# Patient Record
Sex: MALE | Race: WHITE | Employment: FULL TIME | URBAN - NONMETROPOLITAN AREA
[De-identification: names, ages, dates, MRNs, and addresses within clinical notes are randomized per-mention and may not be internally consistent; named-entity substitution may affect disease eponyms.]

---

## 2018-05-08 ENCOUNTER — OFFICE VISIT (OUTPATIENT)
Dept: CARDIOLOGY CLINIC | Age: 28
End: 2018-05-08
Payer: COMMERCIAL

## 2018-05-08 VITALS
DIASTOLIC BLOOD PRESSURE: 81 MMHG | HEART RATE: 107 BPM | WEIGHT: 248.6 LBS | HEIGHT: 72 IN | BODY MASS INDEX: 33.67 KG/M2 | SYSTOLIC BLOOD PRESSURE: 128 MMHG

## 2018-05-08 DIAGNOSIS — Z01.810 PRE-OPERATIVE CARDIOVASCULAR EXAMINATION: Primary | ICD-10-CM

## 2018-05-08 PROCEDURE — 99203 OFFICE O/P NEW LOW 30 MIN: CPT | Performed by: INTERNAL MEDICINE

## 2019-05-28 ENCOUNTER — INITIAL CONSULT (OUTPATIENT)
Dept: PULMONOLOGY | Age: 29
End: 2019-05-28
Payer: COMMERCIAL

## 2019-05-28 VITALS
OXYGEN SATURATION: 94 % | SYSTOLIC BLOOD PRESSURE: 137 MMHG | HEART RATE: 100 BPM | WEIGHT: 269 LBS | BODY MASS INDEX: 36.44 KG/M2 | DIASTOLIC BLOOD PRESSURE: 88 MMHG | HEIGHT: 72 IN

## 2019-05-28 DIAGNOSIS — R06.89 SLEEP RELATED CHOKING SENSATION: ICD-10-CM

## 2019-05-28 DIAGNOSIS — R06.83 SNORING: ICD-10-CM

## 2019-05-28 DIAGNOSIS — E66.9 OBESITY (BMI 30-39.9): ICD-10-CM

## 2019-05-28 DIAGNOSIS — G47.10 HYPERSOMNIA: Primary | ICD-10-CM

## 2019-05-28 DIAGNOSIS — R06.81 WITNESSED EPISODE OF APNEA: ICD-10-CM

## 2019-05-28 PROCEDURE — 99203 OFFICE O/P NEW LOW 30 MIN: CPT | Performed by: INTERNAL MEDICINE

## 2019-05-28 NOTE — LETTER
Tobacco Use    Smoking status: Never Smoker    Smokeless tobacco: Never Used   Substance Use Topics    Alcohol use: Yes     Comment: occasionally    Drug use: No       No Known Allergies    No current outpatient medications on file. No current facility-administered medications for this visit. Family History   Problem Relation Age of Onset    Heart Surgery Brother 1        has down syndrome/has had several since birth. Any family history of any sleep problems or any one in your family on CPAP? Yes    Caffeine Intake: How much soda (pop), coffee, tea, power drinks do you ingest per day? 4 cups of coffee/4 cans pop a day per day. Employment History:  Where do you work? [employer]  What are your shifts? 6:30am to  4 pm but highly variable    Any recent changes in shifts or hours? No    Physical Exam:    HEIGHTHeight: 6' (182.9 cm) WEIGHTWeight: 269 lb (122 kg)    BMI:  Body mass index is 36.48 kg/m². Neck Size: 19.25 inches  Oxygen Sat: room air    ESS: 13   Vitals: /88 (Site: Right Upper Arm, Position: Sitting, Cuff Size: Large Adult)   Pulse 100   Ht 6' (1.829 m)   Wt 269 lb (122 kg)   SpO2 94% Comment: on RA  BMI 36.48 kg/m²        Mallampati Score: 2    General appearance: alert and oriented to person, place and time and obese  Nose: Nares normal. Septum midline. Mucosa normal. No drainage or sinus tenderness. Oropharynx:  Large tongue, crowded pharynx, mallampati class 4  Lungs: clear to auscultation bilaterally  Heart: regular rate and rhythm, S1, S2 normal, no murmur, click, rub or gallop  Extremities: extremities normal, atraumatic, no cyanosis or edema  Neurologic: Mental status: Alert, oriented, thought content appropriate      Assessment    Diagnosis Orders   1. Hypersomnia  Baseline Diagnostic Sleep Study   2. Obesity (BMI 30-39. 9)  Baseline Diagnostic Sleep Study   3.  Snoring  Baseline Diagnostic Sleep Study 4. Sleep related choking sensation  Baseline Diagnostic Sleep Study   5. Witnessed episode of apnea  Baseline Diagnostic Sleep Study       Plan   Orders Placed This Encounter   Procedures    Baseline Diagnostic Sleep Study     Standing Status:   Future     Standing Expiration Date:   5/28/2020     Order Specific Question:   Adult or Pediatric     Answer:   Adult Study (>7 Years)     Order Specific Question:   Location For Sleep Study     Answer:   [facility]     Order Specific Question:   Select Sleep Lab Location     Answer:   [address]          Mask Desensitization and Pre study teaching? No  Weight Loss Information Given? Yes  Sleep Hygiene Discussed? Yes        Order Specific Question:   Adult or Pediatric     Answer:   Adult Study (>7 Years)     Order Specific Question:   Location For Sleep Study     Answer:   [facility]     Order Specific Question:   Select Sleep Lab Location     Answer:   [address]          Mask Desensitization and Pre study teaching? No  Weight Loss Information Given? Yes  Sleep Hygiene Discussed? Yes      If you have questions, please do not hesitate to call me. I look forward to following Andrez along with you.     Sincerely,        Kristin Borrego MD

## 2019-05-28 NOTE — PROGRESS NOTES
New Sleep Patient H/P    Presentation:  Yesika Acosta is referred by Victorina Art  for ELENA    Symptoms began:  several years ago. Symptoms include: snoring, choking, gasping, periods of not breathing, excessive daytime sleepiness, falling asleep while watching television, when inactive, disrupted sleep, naps  Difficult time waking up, 30 minute sleep inertia  Previous evaluation and treatment? No       Time in Bed:   Bedtime: 11p.m. Awakens  5:45 a.m. Different on weekends? Yes, sleeps til 10 am     Andrez falls asleep in 15  minutes. Any awakenings? Yes 3-4 times a nihjt  Difficulty Falling back to sleep? No    Naps:  Any naps? Yes and are they helpful No    Snoring and Apneas:  Do you snore or been told you a snore? Yes  How long have known about your snoring? years  Any witnessed apneas? Yes  Any awakenings with choking or gasping? Yes    Dreams:  Any recurring dreams? No  Hallucinations? No  Sleep Paralysis? No  Symptoms of Cataplexy? No    Driving History:  Do you have a CDL or drive long distances for work? No  Any driving accidents in the past year? No  Any sleepiness while driving? No    Weight:  Any change in weight over the past year? Yes   How about past 5 years? Yes  How much? 15        Past Medical History:   Diagnosis Date    Asthma        Past Surgical History:   Procedure Laterality Date    LUMBAR DISCECTOMY  04/01/2018       Social History     Tobacco Use    Smoking status: Never Smoker    Smokeless tobacco: Never Used   Substance Use Topics    Alcohol use: Yes     Comment: occasionally    Drug use: No       No Known Allergies    No current outpatient medications on file. No current facility-administered medications for this visit. Family History   Problem Relation Age of Onset    Heart Surgery Brother 1        has down syndrome/has had several since birth. Any family history of any sleep problems or any one in your family on CPAP? Yes    Caffeine Intake:   How much soda (pop), coffee, tea, power drinks do you ingest per day? 4 cups of coffee/4 cans pop a day per day. Employment History:  Where do you work? [employer]  What are your shifts? 6:30am to  4 pm but highly variable    Any recent changes in shifts or hours? No    Physical Exam:    HEIGHTHeight: 6' (182.9 cm) WEIGHTWeight: 269 lb (122 kg)    BMI:  Body mass index is 36.48 kg/m². Neck Size: 19.25 inches  Oxygen Sat: room air    ESS: 13   Vitals: /88 (Site: Right Upper Arm, Position: Sitting, Cuff Size: Large Adult)   Pulse 100   Ht 6' (1.829 m)   Wt 269 lb (122 kg)   SpO2 94% Comment: on RA  BMI 36.48 kg/m²       Mallampati Score: 2    General appearance: alert and oriented to person, place and time and obese  Nose: Nares normal. Septum midline. Mucosa normal. No drainage or sinus tenderness. Oropharynx:  Large tongue, crowded pharynx, mallampati class 4  Lungs: clear to auscultation bilaterally  Heart: regular rate and rhythm, S1, S2 normal, no murmur, click, rub or gallop  Extremities: extremities normal, atraumatic, no cyanosis or edema  Neurologic: Mental status: Alert, oriented, thought content appropriate      Assessment    Diagnosis Orders   1. Hypersomnia  Baseline Diagnostic Sleep Study   2. Obesity (BMI 30-39. 9)  Baseline Diagnostic Sleep Study   3. Snoring  Baseline Diagnostic Sleep Study   4. Sleep related choking sensation  Baseline Diagnostic Sleep Study   5. Witnessed episode of apnea  Baseline Diagnostic Sleep Study       Plan   Orders Placed This Encounter   Procedures    Baseline Diagnostic Sleep Study     Standing Status:   Future     Standing Expiration Date:   5/28/2020     Order Specific Question:   Adult or Pediatric     Answer:   Adult Study (>7 Years)     Order Specific Question:   Location For Sleep Study     Answer:   [id number]     Order Specific Question:   Select Sleep Lab Location     Answer:   [address]          Mask Desensitization and Pre study teaching? No  Weight Loss Information Given? Yes  Sleep Hygiene Discussed?  Yes

## 2019-05-28 NOTE — COMMUNICATION BODY
New Sleep Patient H/P    Presentation:  Leopold Gouty is referred by Frank Finn  for ELENA    Symptoms began:  several years ago. Symptoms include: snoring, choking, gasping, periods of not breathing, excessive daytime sleepiness, falling asleep while watching television, when inactive, disrupted sleep, naps  Difficult time waking up, 30 minute sleep inertia  Previous evaluation and treatment? No       Time in Bed:   Bedtime: 11p.m. Awakens  5:45 a.m. Different on weekends? Yes, sleeps til 10 am     Andrez falls asleep in 15  minutes. Any awakenings? Yes 3-4 times a nihjt  Difficulty Falling back to sleep? No    Naps:  Any naps? Yes and are they helpful No    Snoring and Apneas:  Do you snore or been told you a snore? Yes  How long have known about your snoring? years  Any witnessed apneas? Yes  Any awakenings with choking or gasping? Yes    Dreams:  Any recurring dreams? No  Hallucinations? No  Sleep Paralysis? No  Symptoms of Cataplexy? No    Driving History:  Do you have a CDL or drive long distances for work? No  Any driving accidents in the past year? No  Any sleepiness while driving? No    Weight:  Any change in weight over the past year? Yes   How about past 5 years? Yes  How much? 15        Past Medical History:   Diagnosis Date    Asthma        Past Surgical History:   Procedure Laterality Date    LUMBAR DISCECTOMY  04/01/2018       Social History     Tobacco Use    Smoking status: Never Smoker    Smokeless tobacco: Never Used   Substance Use Topics    Alcohol use: Yes     Comment: occasionally    Drug use: No       No Known Allergies    No current outpatient medications on file. No current facility-administered medications for this visit. Family History   Problem Relation Age of Onset    Heart Surgery Brother 1        has down syndrome/has had several since birth. Any family history of any sleep problems or any one in your family on CPAP? Yes    Caffeine Intake:   How much soda (pop), coffee, tea, power drinks do you ingest per day? 4 cups of coffee/4 cans pop a day per day. Employment History:  Where do you work? [employer]  What are your shifts? 6:30am to  4 pm but highly variable    Any recent changes in shifts or hours? No    Physical Exam:    HEIGHTHeight: 6' (182.9 cm) WEIGHTWeight: 269 lb (122 kg)    BMI:  Body mass index is 36.48 kg/m². Neck Size: 19.25 inches  Oxygen Sat: room air    ESS: 13   Vitals: /88 (Site: Right Upper Arm, Position: Sitting, Cuff Size: Large Adult)   Pulse 100   Ht 6' (1.829 m)   Wt 269 lb (122 kg)   SpO2 94% Comment: on RA  BMI 36.48 kg/m²        Mallampati Score: 2    General appearance: alert and oriented to person, place and time and obese  Nose: Nares normal. Septum midline. Mucosa normal. No drainage or sinus tenderness. Oropharynx:  Large tongue, crowded pharynx, mallampati class 4  Lungs: clear to auscultation bilaterally  Heart: regular rate and rhythm, S1, S2 normal, no murmur, click, rub or gallop  Extremities: extremities normal, atraumatic, no cyanosis or edema  Neurologic: Mental status: Alert, oriented, thought content appropriate      Assessment    Diagnosis Orders   1. Hypersomnia  Baseline Diagnostic Sleep Study   2. Obesity (BMI 30-39. 9)  Baseline Diagnostic Sleep Study   3. Snoring  Baseline Diagnostic Sleep Study   4. Sleep related choking sensation  Baseline Diagnostic Sleep Study   5. Witnessed episode of apnea  Baseline Diagnostic Sleep Study       Plan   Orders Placed This Encounter   Procedures    Baseline Diagnostic Sleep Study     Standing Status:   Future     Standing Expiration Date:   5/28/2020     Order Specific Question:   Adult or Pediatric     Answer:   Adult Study (>7 Years)     Order Specific Question:   Location For Sleep Study     Answer:   [facility]     Order Specific Question:   Select Sleep Lab Location     Answer:   [address]          Mask Desensitization and Pre study teaching? No  Weight Loss Information Given? Yes  Sleep Hygiene Discussed? Yes          Order Specific Question:   Adult or Pediatric     Answer:   Adult Study (>7 Years)     Order Specific Question:   Location For Sleep Study     Answer:   [facility]     Order Specific Question:   Select Sleep Lab Location     Answer:   [address]          Mask Desensitization and Pre study teaching? No  Weight Loss Information Given? Yes  Sleep Hygiene Discussed?  Yes

## 2019-06-23 DIAGNOSIS — G47.33 OSA (OBSTRUCTIVE SLEEP APNEA): Primary | ICD-10-CM

## 2019-07-08 ENCOUNTER — OFFICE VISIT (OUTPATIENT)
Dept: PULMONOLOGY | Age: 29
End: 2019-07-08
Payer: COMMERCIAL

## 2019-07-08 VITALS
WEIGHT: 272.2 LBS | DIASTOLIC BLOOD PRESSURE: 87 MMHG | HEART RATE: 75 BPM | SYSTOLIC BLOOD PRESSURE: 138 MMHG | OXYGEN SATURATION: 98 % | BODY MASS INDEX: 36.87 KG/M2 | HEIGHT: 72 IN

## 2019-07-08 DIAGNOSIS — G47.33 OSA (OBSTRUCTIVE SLEEP APNEA): Primary | ICD-10-CM

## 2019-07-08 PROCEDURE — 99214 OFFICE O/P EST MOD 30 MIN: CPT | Performed by: NURSE PRACTITIONER

## 2019-07-08 NOTE — PROGRESS NOTES
itching. Exam  Vitals -  /87 (Site: Left Upper Arm, Position: Sitting, Cuff Size: Large Adult)   Pulse 75   Ht 6' (1.829 m)   Wt 272 lb 3.2 oz (123.5 kg)   SpO2 98% Comment: on RA  BMI 36.92 kg/m²    Body mass index is 36.92 kg/m². Oxygen level - Room Air  Physical Exam     General Appearance Moderately built, moderately nourished, in no acute distress. HEENT - Head is normocephalic, atraumatic. PERRL. Oral mucosa pink and moist, no oral thrush. Mallampati Score - II (soft palate, uvula, fauces visible). Neck - Supple, symmetrical, trachea midline and soft. Lungs - Chest symmetric with normal A/P diameter, normal respiratory rate and rhythm, lungs clear to auscultation, no wheezes, rales or rhonchi, aeration good. Cardiovascular - Heart sounds are normal. Regular rhythm normal rate without murmur, gallop or rub. Abdomen - Soft, nontender, non-distended. Neurologic - Alert and oriented x 3. Skin - No bruising or bleeding. Extremities - No cyanosis, clubbing or edema. Assessment   Diagnosis Orders   1. ELENA (obstructive sleep apnea)  Sleep Study with PAP Titration      Recommendations  I reviewed the sleep study results in detail with Marco A Antunez and his wife. We discussed treatment options of positional therapy, OAT and PAP therapy along with the benefits and limitations of each. Based on the severity of his apnea, PAP therapy is recommended. He agrees to proceed with CPAP titration with mask fit. Follow up 6-8 weeks after PAP set up.     Electronically signed by ZIGGY Cunha CNP on 7/8/2019 at 9:25 AM

## 2019-08-15 ENCOUNTER — HOSPITAL ENCOUNTER (OUTPATIENT)
Dept: SLEEP CENTER | Age: 29
Discharge: HOME | End: 2019-08-17
Payer: COMMERCIAL

## 2019-08-15 DIAGNOSIS — G47.33 OSA (OBSTRUCTIVE SLEEP APNEA): ICD-10-CM

## 2019-08-15 PROCEDURE — 95811 POLYSOM 6/>YRS CPAP 4/> PARM: CPT

## 2019-08-20 LAB — STATUS: NORMAL

## 2019-08-25 DIAGNOSIS — G47.33 OSA (OBSTRUCTIVE SLEEP APNEA): Primary | ICD-10-CM

## 2019-08-25 NOTE — PROGRESS NOTES
[address] [place] [address]                               SLEEP STUDY REPORT    PATIENT NAME: Rogelio Meza                    :        1990  MED REC NO:   [id number]                           ROOM:  ACCOUNT NO:   [de-identified]                           ADMIT DATE: 08/15/2019  PROVIDER:     ZEYAD Anderson STUDY:  08/15/2019    CPAP TITRATION POLYSOMNOGRAM    REFERRING PROVIDER:  Emiliano Brito DO    HISTORY OF PRESENT ILLNESS:  The patient is a 25-year-old gentleman  recently diagnosed with severe sleep apnea via diagnostic polysomnogram.  The AHI was 60.2. Weight 272 pounds, height 72 inches, BMI 36.9. METHODS:  The patient underwent digital polysomnography in compliance  with the standards and specifications from the AASM Manual including the  simultaneous recording of 3 EEG channels (F4-M1, C4-M1, and O2-M1 with  back up electrodes F3-M2, C3-M2, and O1-M2), 2 EOG channels (E1-M2, and  E2-M1,), EMG (chin, left & right leg), EKG, Nonin pulse oximetry with   less than 2 second averaging time, body position, airflow recorded by  oral-nasal thermal sensor and nasal air pressure transducer, plus  respiratory effort recorded by calibrated respiratory inductance  plethysmography (RIP), flow volume loop, sound and video. Sleep staging  and scoring followed the standard put forth by the American Academy of  Sleep Medicine and utilized the 4A obstructive hypopnea event  desaturation of 4 percent or greater. DETAILS OF THE STUDY:  Lights out 09:21 p.m., lights on 05:25 a.m. Total recording time 483 minutes, sleep period time 481 minutes, total  sleep time 443 minutes, sleep efficiency 91.6%, latency to sleep 1.9  minutes, wake after sleep onset 38 minutes, latency to REM 96 minutes.     SLEEP STAGING:  The patient slept 18.5 minutes or 4.4% of total sleep  time in N1 sleep, 315 minutes or 71% in N2 sleep, 31 minutes

## 2019-08-26 ENCOUNTER — TELEPHONE (OUTPATIENT)
Dept: SLEEP CENTER | Age: 29
End: 2019-08-26

## 2019-11-25 ENCOUNTER — OFFICE VISIT (OUTPATIENT)
Dept: PULMONOLOGY | Age: 29
End: 2019-11-25
Payer: COMMERCIAL

## 2019-11-25 VITALS
HEIGHT: 72 IN | DIASTOLIC BLOOD PRESSURE: 82 MMHG | OXYGEN SATURATION: 96 % | WEIGHT: 280.2 LBS | HEART RATE: 84 BPM | SYSTOLIC BLOOD PRESSURE: 132 MMHG | BODY MASS INDEX: 37.95 KG/M2

## 2019-11-25 DIAGNOSIS — G47.26 SHIFTING SLEEP-WORK SCHEDULE: ICD-10-CM

## 2019-11-25 DIAGNOSIS — G47.33 OSA TREATED WITH BIPAP: Primary | ICD-10-CM

## 2019-11-25 PROCEDURE — 99213 OFFICE O/P EST LOW 20 MIN: CPT | Performed by: NURSE PRACTITIONER

## 2020-02-26 NOTE — PROGRESS NOTES
bleeding. Extremities - No cyanosis, clubbing or edema. ASSESSMENT/DIAGNOSIS     Diagnosis Orders   1. ELENA treated with BiPAP     2. Shifting sleep-work schedule              Plan   Do you need any equipment today? No.    -Discussed need for increased compliance for insurance coverage and health benefit.  -Wear when napping as well. -Will decrease pressure to 15/11, to call if no improvement. Repeat download in 2-4 weeks. - He was advised to continue current positive airway pressure therapy with above described pressure. - He was advised to keep good compliance with current recommended pressure to get optimal results and clinical improvement.  - Recommend 7-9 hours of sleep with PAP treatment. - He was advised to call [employer] regarding supplies if needed.   -He is to call my office for earlier appointment if needed for worsening of sleep symptoms.   - He was instructed on weight loss. - Riya Penn was educated about my impression and plan and verbalizes understanding. We will see Stanton Toth back in: 6 months with download.     ZIGGY Davis - CNP  2/27/2020 1:04 PM

## 2020-02-27 ENCOUNTER — OFFICE VISIT (OUTPATIENT)
Dept: PULMONOLOGY | Age: 30
End: 2020-02-27
Payer: COMMERCIAL

## 2020-02-27 VITALS
HEART RATE: 79 BPM | WEIGHT: 282.4 LBS | OXYGEN SATURATION: 98 % | BODY MASS INDEX: 38.25 KG/M2 | SYSTOLIC BLOOD PRESSURE: 134 MMHG | DIASTOLIC BLOOD PRESSURE: 78 MMHG | HEIGHT: 72 IN

## 2020-02-27 PROCEDURE — 99213 OFFICE O/P EST LOW 20 MIN: CPT | Performed by: NURSE PRACTITIONER

## 2020-03-12 ENCOUNTER — CLINICAL DOCUMENTATION (OUTPATIENT)
Dept: PULMONOLOGY | Age: 30
End: 2020-03-12

## 2020-03-12 NOTE — PROGRESS NOTES
Download reviewed with decrease in pressure to 15/11 and controlled AHI. No calls from patient, continue current settings.     Electronically signed by ZIGGY Paula CNP on 3/12/2020 at 4:29 PM

## 2020-08-27 ENCOUNTER — OFFICE VISIT (OUTPATIENT)
Dept: PULMONOLOGY | Age: 30
End: 2020-08-27
Payer: COMMERCIAL

## 2020-08-27 VITALS
HEART RATE: 89 BPM | TEMPERATURE: 98.9 F | DIASTOLIC BLOOD PRESSURE: 84 MMHG | SYSTOLIC BLOOD PRESSURE: 136 MMHG | HEIGHT: 73 IN | BODY MASS INDEX: 37.91 KG/M2 | OXYGEN SATURATION: 97 % | WEIGHT: 286 LBS

## 2020-08-27 PROCEDURE — 99213 OFFICE O/P EST LOW 20 MIN: CPT | Performed by: NURSE PRACTITIONER

## 2020-08-27 NOTE — PROGRESS NOTES
[facility] and [address] [place] [address] Navneet         [phone]  8/27/2020   Chief Complaint   Patient presents with    Follow-up     ELENA follow u pwith download     Other     Last office visit was 2/27/20        Pt of Dr. Rufus Mora     PAP Download:   Selene Velez or initial AHI: 60.2   Date of initial study: 6/18/19  Weight of initial study: 269 lbs  [x] Compliant  80%   [] Noncompliant 20%     PAP Type  cpap Level  15/11   Avg Hrs/Day 5 hour 21 min   AHI: 1.1    Recorded compliance dates,  7/27/20  to 8/25/20    Machine/Mfg:  Plumbr  Interface: FFM     Provider:  [x]-SHEILA  []Maira [][facility]  [][facility]         [][facility] []Other:     Neck Size: 19.25  Mallampati 2  ESS: 8  Here is a scan of the most recent download:              Presentation:   Medhat Hoover presents for sleep medicine follow up for obstructive sleep apnea. Since the last visit, Medhat Hoover is doing better with treatment. Pressure was decreased from 16/11 to 15/11 last visit with resolved GI symptoms. Weight is up 17 lbs since study. Equipment issues: The pressure is acceptable, the mask is acceptable and he is using the humidity. Sleep issues:  Do you feel better? Yes  More rested? Yes   Better concentration? yes    Progress History:   Since last visit any new medical issues? No  New ER or hospitlal visits? No  Any new or changes in medicines? No  Any new sleep medicines?  No      Past Medical History:   Diagnosis Date    Asthma     ELENA treated with BiPAP        Past Surgical History:   Procedure Laterality Date    LUMBAR DISCECTOMY  04/01/2018       Social History     Tobacco Use    Smoking status: Never Smoker    Smokeless tobacco: Never Used   Substance Use Topics    Alcohol use: Yes     Comment: occasionally    Drug use: No       No Known Allergies    Current Outpatient Medications   Medication Sig Dispense Refill    CPAP Machine MISC by Does not apply route Please change BiPAP pressure to IPAP 15, keep EPAP at 11  cm H20. Repeat download in 2-4 weeks. 1 each 0     No current facility-administered medications for this visit. Family History   Problem Relation Age of Onset    Heart Surgery Brother 1        has down syndrome/has had several since birth.  Sleep Apnea Mother           Review of Systems   General/Constitutional: No recent loss of weight or appetite changes. No fever or chills. HENT: Negative. Eyes: Negative. Upper respiratory tract: No nasal stuffiness or post nasal drip. Lower respiratory tract/ lungs: No cough or sputum production. No hemoptysis. Cardiovascular: No palpitations or chest pain. Gastrointestinal: No nausea or vomiting. Neurological: No focal neurologiacal weakness. Extremities: No edema. Musculoskeletal: No complaints. Genitourinary: No complaints. Hematological: Negative. Psychiatric/Behavioral: Negative. Skin: No itching. Physical Exam:    BMI: Body mass index is 37.73 kg/m². Wt Readings from Last 3 Encounters:   08/27/20 286 lb (129.7 kg)   02/27/20 282 lb 6.4 oz (128.1 kg)   11/25/19 280 lb 3.2 oz (127.1 kg)     Weight gained 17 lbs since stust  Vitals: /84   Pulse 89   Temp 98.9 °F (37.2 °C) (Tympanic)   Ht 6' 1\" (1.854 m)   Wt 286 lb (129.7 kg)   SpO2 97% Comment: room air  BMI 37.73 kg/m²         General Appearance - Moderately built, moderately nourished, in no acute distress. HEENT - Head is normocephalic, atraumatic. PERRL. Oral mucosa pink and moist, no oral thrush. Mallampati Score - II (soft palate, uvula, fauces visible). Neck - Supple, symmetrical, trachea midline and soft. Lungs - Clear to auscultation, no wheezes, rales or rhonchi, aeration good. Cardiovascular - Heart sounds are normal. Regular rhythm normal rate without murmur, gallop or rub. Abdomen - Soft, nontender, non-distended. Neurologic - Alert and oriented x 3. Skin - No bruising or bleeding. Extremities - No cyanosis, clubbing or edema.       ASSESSMENT/DIAGNOSIS Diagnosis Orders   1. ELENA treated with BiPAP              Plan   Do you need any equipment today? No.  -Monitor weight.    - He was advised to continue current positive airway pressure therapy with above described pressure. - He was advised to keep good compliance with current recommended pressure to get optimal results and clinical improvement.  - Recommend 7-9 hours of sleep with PAP treatment. - He was advised to call [employer] regarding supplies if needed.   -He is to call my office for earlier appointment if needed for worsening of sleep symptoms.   - He was instructed on weight loss. - Aaliyah Pina was educated about my impression and plan and verbalizes understanding. We will see [place] Dylan Toth back in: 6 months with download.     Laura Cui   8/27/2020 11:05 AM

## 2021-02-26 NOTE — PROGRESS NOTES
change BiPAP pressure to IPAP 15, keep EPAP at 11  cm H20. Repeat download in 2-4 weeks. 1 each 0     No current facility-administered medications for this visit. Review of Systems   General/Constitutional: No recent loss of weight or appetite changes. No fever or chills. HENT: Negative. Eyes: Negative. Upper respiratory tract: No nasal stuffiness or post nasal drip. Lower respiratory tract/ lungs: No cough or sputum production. No hemoptysis. Cardiovascular: No palpitations or chest pain. Gastrointestinal: No nausea or vomiting. Neurological: No focal neurologiacal weakness. Extremities: No increased edema. Musculoskeletal: No new complaints. Genitourinary: No complaints. Hematological: Negative. Psychiatric/Behavioral: Negative. Skin: No itching. Physical Exam:    BMI: Body mass index is 38.52 kg/m². Wt Readings from Last 3 Encounters:   03/01/21 292 lb (132.5 kg)   08/27/20 286 lb (129.7 kg)   02/27/20 282 lb 6.4 oz (128.1 kg)     Weight: stable / unchanged since last seen (up 20 lbs since study)  Vitals: /78 (Site: Right Upper Arm, Position: Sitting, Cuff Size: Medium Adult)   Pulse 84   Temp 98.1 °F (36.7 °C)   Ht 6' 1\" (1.854 m)   Wt 292 lb (132.5 kg)   SpO2 97% Comment: room air at rest  BMI 38.52 kg/m²         General Appearance - Moderately built, moderately nourished, in no acute distress. HEENT - Head is normocephalic, atraumatic. PERRL. Oral mucosa pink and moist, no oral thrush. Mallampati Score - II (soft palate, uvula, fauces visible). Neck - Supple, symmetrical, trachea midline and soft. Lungs - Clear to auscultation, no wheezes, rales or rhonchi, aeration good. Cardiovascular - Heart sounds are normal. Regular rhythm normal rate without murmur, gallop or rub. Abdomen - Soft, nontender, non-distended. Neurologic - Alert and oriented x 3. Skin - No bruising or bleeding. Extremities - No cyanosis, clubbing or edema.       ASSESSMENT/DIAGNOSIS Diagnosis Orders   1. ELENA on CPAP  DME Order for CPAP as OP            Plan     Do you need any equipment today? Yes Rx for new supplies.   -Download reviewed, doing well. -Monitor weight.    - He was advised to continue current positive airway pressure therapy with above described pressure. - He was advised to keep good compliance with current recommended pressure to get optimal results and clinical improvement.  - Educated on the health risks with untreated sleep apnea. - Recommend 7-9 hours of sleep with PAP treatment. - Re-educated on proper sleep hygiene. - He was advised to call DME company regarding supplies if needed. - He is to call my office for earlier appointment if needed for worsening of sleep symptoms.   - He was instructed on weight loss. - Jennifer Spencer was educated about my impression and plan and verbalizes understanding. We will see Jessica Toth back in: 1 year with download.       Electronically signed by ZIGGY Kearney CNP on 3/1/2021 at 11:10 AM

## 2021-03-01 ENCOUNTER — OFFICE VISIT (OUTPATIENT)
Dept: PULMONOLOGY | Age: 31
End: 2021-03-01
Payer: COMMERCIAL

## 2021-03-01 VITALS
TEMPERATURE: 98.1 F | HEART RATE: 84 BPM | WEIGHT: 292 LBS | DIASTOLIC BLOOD PRESSURE: 78 MMHG | OXYGEN SATURATION: 97 % | HEIGHT: 73 IN | SYSTOLIC BLOOD PRESSURE: 118 MMHG | BODY MASS INDEX: 38.7 KG/M2

## 2021-03-01 DIAGNOSIS — Z99.89 OSA ON CPAP: Primary | ICD-10-CM

## 2021-03-01 DIAGNOSIS — G47.33 OSA ON CPAP: Primary | ICD-10-CM

## 2021-03-01 PROCEDURE — 99212 OFFICE O/P EST SF 10 MIN: CPT | Performed by: NURSE PRACTITIONER

## 2022-03-10 ENCOUNTER — OFFICE VISIT (OUTPATIENT)
Dept: PULMONOLOGY | Age: 32
End: 2022-03-10
Payer: COMMERCIAL

## 2022-03-10 VITALS
DIASTOLIC BLOOD PRESSURE: 78 MMHG | HEIGHT: 73 IN | HEART RATE: 94 BPM | OXYGEN SATURATION: 96 % | WEIGHT: 288 LBS | TEMPERATURE: 98 F | BODY MASS INDEX: 38.17 KG/M2 | SYSTOLIC BLOOD PRESSURE: 122 MMHG

## 2022-03-10 DIAGNOSIS — G47.33 OSA TREATED WITH BIPAP: Primary | ICD-10-CM

## 2022-03-10 DIAGNOSIS — E66.9 OBESITY (BMI 30-39.9): ICD-10-CM

## 2022-03-10 PROCEDURE — 99213 OFFICE O/P EST LOW 20 MIN: CPT | Performed by: NURSE PRACTITIONER

## 2022-03-10 ASSESSMENT — ENCOUNTER SYMPTOMS
VOMITING: 0
WHEEZING: 0
DIARRHEA: 0
COUGH: 0
EYES NEGATIVE: 1
SHORTNESS OF BREATH: 0
ABDOMINAL PAIN: 0
NAUSEA: 0

## 2022-03-10 NOTE — PROGRESS NOTES
Negative. Psychiatric/Behavioral: Negative. Negative for sleep disturbance. Physical Exam:    BMI:  Body mass index is 38 kg/m². Wt Readings from Last 3 Encounters:   03/10/22 288 lb (130.6 kg)   03/01/21 292 lb (132.5 kg)   08/27/20 286 lb (129.7 kg)     Weight lost 5 lbs over 1year   Vitals: /78 (Site: Left Upper Arm, Position: Sitting)   Pulse 94   Temp 98 °F (36.7 °C)   Ht 6' 1\" (1.854 m)   Wt 288 lb (130.6 kg)   SpO2 96% Comment: on RA  BMI 38.00 kg/m²       Physical Exam  Vitals and nursing note reviewed. Constitutional:       Appearance: Normal appearance. He is overweight. HENT:      Head: Normocephalic and atraumatic. Mouth/Throat:      Pharynx: Oropharynx is clear. Eyes:      Conjunctiva/sclera: Conjunctivae normal.   Pulmonary:      Effort: Pulmonary effort is normal. No tachypnea, bradypnea or respiratory distress. Skin:     Findings: No erythema or rash. Neurological:      Mental Status: He is alert and oriented to person, place, and time. Psychiatric:         Attention and Perception: Attention normal.         Mood and Affect: Mood normal.         Speech: Speech normal.         Behavior: Behavior normal.         Thought Content: Thought content normal.         Cognition and Memory: Cognition normal.         Judgment: Judgment normal.           ASSESSMENT/DIAGNOSIS     Diagnosis Orders   1. ELENA treated with BiPAP  DME Order for CPAP as OP   2. Obesity (BMI 30-39. 9)          Plan   Do you need any equipment today? Yes -printed rx for supplies, printed and given to patient's with names of other local DMEs to use      - Download reviewed and discussed with patient  - He  was advised to continue current positive airway pressure therapy with above described pressure.    - He  advised to keep good compliance with current recommended pressure to get optimal results and clinical improvement  - Recommend 7-9 hours of sleep with PAP  - He was advised to call DME company regarding supplies if needed.   -He call my office for earlier appointment if needed for worsening of sleep symptoms.   - He was instructed on weight loss  - Madhav Foy was educated about my impression and plan. Patient verbalizesunderstanding.     We will see Stuart Marshall Risser back in: 1 year with download    Information added by my medical assistant/LPN was reviewed today    billing based on medical decision making     Asher Sacks, APRN-CNP   3/10/2022

## 2022-03-10 NOTE — PATIENT INSTRUCTIONS
Other DME companies :     Katy Weaver     [facility] in [place] - in [address] [place] [address]

## 2022-10-11 ENCOUNTER — TELEPHONE (OUTPATIENT)
Dept: PULMONOLOGY | Age: 32
End: 2022-10-11

## 2022-10-11 NOTE — TELEPHONE ENCOUNTER
Patient was last seen 3/10/2022 and his next appt is 3/9/2023. Due to insurance he has to change [facility] [facility] his cpap and supplies. He needs a copy of his sleep study, last office visit note, order/prescription for supplies and a copy of his [employer] insurance card to be faxed to [address]. Please send.

## 2023-03-09 ENCOUNTER — OFFICE VISIT (OUTPATIENT)
Dept: PULMONOLOGY | Age: 33
End: 2023-03-09
Payer: COMMERCIAL

## 2023-03-09 VITALS
HEIGHT: 73 IN | WEIGHT: 298.4 LBS | DIASTOLIC BLOOD PRESSURE: 78 MMHG | HEART RATE: 89 BPM | SYSTOLIC BLOOD PRESSURE: 120 MMHG | OXYGEN SATURATION: 96 % | BODY MASS INDEX: 39.55 KG/M2 | TEMPERATURE: 98.8 F

## 2023-03-09 DIAGNOSIS — E66.9 OBESITY (BMI 30-39.9): ICD-10-CM

## 2023-03-09 DIAGNOSIS — G47.33 OSA TREATED WITH BIPAP: Primary | ICD-10-CM

## 2023-03-09 PROCEDURE — 99213 OFFICE O/P EST LOW 20 MIN: CPT | Performed by: NURSE PRACTITIONER

## 2023-03-09 ASSESSMENT — ENCOUNTER SYMPTOMS
ABDOMINAL PAIN: 0
NAUSEA: 0
VOMITING: 0
WHEEZING: 0
DIARRHEA: 0
EYES NEGATIVE: 1
SHORTNESS OF BREATH: 0
COUGH: 0

## 2023-03-09 NOTE — PROGRESS NOTES
[facility]      Merle          [id number]  3/9/2023   Chief Complaint   Patient presents with    Follow-up     ELENA 1 year f/u with download. Pt of Dr. Madhavi Polanco    PAP Download:   Original or initial AHI: 60.2     Date of initial study: 6/18/19      Compliant  90%     Noncompliant 10 %     PAP Type Spont Level  11-15 cmH2O   Avg Hrs/Day 5hrs 49mins  AHI: 1.8   Recorded compliance dates , 2/6/23  to 3/7/23   Machine/Mfg:   [x] [employer]Med    [] Respironics/Dreamstation   Interface:   [] Nasal    [] Nasal pillows   [x] FFM      Provider:      [x] [facility]     [facility]     [] [employer]    [] [address]    [] [facility]               [] [facility]      [] [employer]    [] [address]      [] Other    Neck Size: 19.25  Mallampati 2  ESS:  6  SAQLI: 83    Here is a scan of the most recent download:                Presentation:   Jeffery Rolon presents for sleep medicine follow up for obstructive sleep apnea  Since the last visit, Jeffery Rolon doing well on BiPAP, no complaints . Weight stable / unchanged    Equipment issues: The pressure is  acceptable, the mask is acceptable     Sleep issues:  Do you feel better? Yes  More rested? Yes   Better concentration? yes  Difficulty falling sleep? No  Difficulty staying asleep? Yes and No  Snoring? No    Progress History:   Since last visit any new medical issues? No  New ER or hospital visits? No  Any new or changes in medicines? No  Any new sleep medicines? No    Review of Systems -   Review of Systems   Constitutional:  Negative for activity change, appetite change, chills, fatigue, fever and unexpected weight change. HENT: Negative. Eyes: Negative. Respiratory:  Negative for cough, shortness of breath and wheezing. Cardiovascular:  Negative for chest pain, palpitations and leg swelling. Gastrointestinal:  Negative for abdominal pain, diarrhea, nausea and vomiting. Genitourinary: Negative. Musculoskeletal: Negative. Skin: Negative. Neurological: Negative. Hematological: Negative. Psychiatric/Behavioral: Negative. Negative for sleep disturbance. Physical Exam:    BMI:  Body mass index is 39.37 kg/m². Wt Readings from Last 3 Encounters:   03/09/23 298 lb 6.4 oz (135.4 kg)   03/10/22 288 lb (130.6 kg)   03/01/21 292 lb (132.5 kg)     Vitals: /78   Pulse 89   Temp 98.8 °F (37.1 °C)   Ht 6' 1\" (1.854 m)   Wt 298 lb 6.4 oz (135.4 kg)   SpO2 96%   BMI 39.37 kg/m²       Physical Exam  Vitals and nursing note reviewed. Constitutional:       Appearance: Normal appearance. He is overweight. HENT:      Head: Normocephalic and atraumatic. Mouth/Throat:      Pharynx: Oropharynx is clear. Eyes:      Conjunctiva/sclera: Conjunctivae normal.   Pulmonary:      Effort: Pulmonary effort is normal. No tachypnea, bradypnea or respiratory distress. Skin:     Findings: No erythema or rash. Neurological:      Mental Status: He is alert and oriented to person, place, and time. Psychiatric:         Attention and Perception: Attention normal.         Mood and Affect: Mood normal.         Speech: Speech normal.         Behavior: Behavior normal.         Thought Content: Thought content normal.         Cognition and Memory: Cognition normal.         Judgment: Judgment normal.         ASSESSMENT/DIAGNOSIS     Diagnosis Orders   1. ELENA treated with BiPAP  DME Order for CPAP as OP      2. Obesity (BMI 30-39. 9)  DME Order for CPAP as OP               Plan   Do you need any equipment today? Yes rx for supplies   -Patient's symptoms and AHI are well controlled with current settings      -Advised to continue current positive airway pressure therapy with above described pressure.    -Advised to keep good compliance with current recommended pressure to get optimal results and clinical improvement  -Recommend 7-9 hours of sleep with PAP  -Instructed to call DME company regarding supplies if needed.   -Patient to call office for earlier appointment if needed for worsening of sleep symptoms. -Advised patient not to drive if feeling sleepy  -Discussed weight loss  -Educated about my impression and plan. Patient verbalizes understanding. Will see Tj Toth back in: 1 year with download. Information added by my medical assistant/LPN was reviewed today.     Electronically signed by ZIGGY Hernandez CNP on 3/9/2023 at 1:07 PM Fibrosarcoma Histology Text: There is a tumor consisting of uniform spindle cells organized in fascicles arranged in acute angles to each other (\"herring bone\" pattern).  Occasional cells are more round.  Mitosis are commonly seen.

## 2023-05-01 ENCOUNTER — TELEPHONE (OUTPATIENT)
Dept: PULMONOLOGY | Age: 33
End: 2023-05-01

## 2023-05-01 NOTE — TELEPHONE ENCOUNTER
Patient was last seen 3/9/2023 and his next appt is 3/7/2024. He is switching his DME company to [facility] and he needs to have his cpap order, chart notes and copy of sleep study faxed to [facility] at [phone]. Please advise.

## 2023-10-05 ENCOUNTER — OFFICE VISIT (OUTPATIENT)
Dept: FAMILY MEDICINE CLINIC | Age: 33
End: 2023-10-05
Payer: COMMERCIAL

## 2023-10-05 VITALS
SYSTOLIC BLOOD PRESSURE: 136 MMHG | RESPIRATION RATE: 16 BRPM | OXYGEN SATURATION: 96 % | DIASTOLIC BLOOD PRESSURE: 86 MMHG | BODY MASS INDEX: 39.16 KG/M2 | HEART RATE: 84 BPM | TEMPERATURE: 97.2 F | WEIGHT: 296.8 LBS

## 2023-10-05 DIAGNOSIS — Z11.59 ENCOUNTER FOR HCV SCREENING TEST FOR LOW RISK PATIENT: ICD-10-CM

## 2023-10-05 DIAGNOSIS — Z13.31 DEPRESSION SCREEN: ICD-10-CM

## 2023-10-05 DIAGNOSIS — Z00.00 WELL ADULT EXAM: Primary | ICD-10-CM

## 2023-10-05 DIAGNOSIS — E66.09 CLASS 2 OBESITY DUE TO EXCESS CALORIES WITHOUT SERIOUS COMORBIDITY WITH BODY MASS INDEX (BMI) OF 39.0 TO 39.9 IN ADULT: ICD-10-CM

## 2023-10-05 DIAGNOSIS — Z11.4 SCREENING FOR HUMAN IMMUNODEFICIENCY VIRUS: ICD-10-CM

## 2023-10-05 DIAGNOSIS — G47.33 OSA ON CPAP: ICD-10-CM

## 2023-10-05 PROBLEM — E66.812 CLASS 2 OBESITY DUE TO EXCESS CALORIES WITHOUT SERIOUS COMORBIDITY WITH BODY MASS INDEX (BMI) OF 39.0 TO 39.9 IN ADULT: Status: ACTIVE | Noted: 2023-10-05

## 2023-10-05 PROCEDURE — 99385 PREV VISIT NEW AGE 18-39: CPT | Performed by: STUDENT IN AN ORGANIZED HEALTH CARE EDUCATION/TRAINING PROGRAM

## 2023-10-05 SDOH — ECONOMIC STABILITY: FOOD INSECURITY: WITHIN THE PAST 12 MONTHS, THE FOOD YOU BOUGHT JUST DIDN'T LAST AND YOU DIDN'T HAVE MONEY TO GET MORE.: NEVER TRUE

## 2023-10-05 SDOH — ECONOMIC STABILITY: HOUSING INSECURITY
IN THE LAST 12 MONTHS, WAS THERE A TIME WHEN YOU DID NOT HAVE A STEADY PLACE TO SLEEP OR SLEPT IN A SHELTER (INCLUDING NOW)?: NO

## 2023-10-05 SDOH — ECONOMIC STABILITY: FOOD INSECURITY: WITHIN THE PAST 12 MONTHS, YOU WORRIED THAT YOUR FOOD WOULD RUN OUT BEFORE YOU GOT MONEY TO BUY MORE.: NEVER TRUE

## 2023-10-05 SDOH — ECONOMIC STABILITY: INCOME INSECURITY: HOW HARD IS IT FOR YOU TO PAY FOR THE VERY BASICS LIKE FOOD, HOUSING, MEDICAL CARE, AND HEATING?: NOT HARD AT ALL

## 2023-10-05 ASSESSMENT — PATIENT HEALTH QUESTIONNAIRE - PHQ9
SUM OF ALL RESPONSES TO PHQ QUESTIONS 1-9: 0
1. LITTLE INTEREST OR PLEASURE IN DOING THINGS: 0
SUM OF ALL RESPONSES TO PHQ9 QUESTIONS 1 & 2: 0
2. FEELING DOWN, DEPRESSED OR HOPELESS: 0

## 2023-10-05 ASSESSMENT — ENCOUNTER SYMPTOMS
CONSTIPATION: 0
EYE REDNESS: 0
ABDOMINAL PAIN: 0
COUGH: 0
DIARRHEA: 0
NAUSEA: 0
SHORTNESS OF BREATH: 0
EYE PAIN: 0
VOMITING: 0

## 2023-10-05 NOTE — PROGRESS NOTES
Rima Toth (:  1990) is a 28 y.o. male,Established patient, here for evaluation of the following chief complaint(s):  New Patient (Est. Care w/ Dr. Kittie Sicard /), Health Maintenance (Depression Screening; HIV Screening; Hep C Screening; Vaccines /), and Weight Loss (Would like to look at adipex therapy /)       ASSESSMENT/PLAN:  1. Well adult exam  -     CBC with Auto Differential; Future  -     Comprehensive Metabolic Panel; Future  2. ELENA on CPAP  3. Class 2 obesity due to excess calories without serious comorbidity with body mass index (BMI) of 39.0 to 39.9 in adult  -     Hemoglobin A1C; Future  -     Lipid Panel; Future  -     TSH with Reflex; Future  4. Depression screen  5. Screening for human immunodeficiency virus  -     HIV Screen; Future  6. Encounter for HCV screening test for low risk patient  -     Hepatitis C Antibody; Future  50-year-old male presents the office to establish as a new patient discuss chronic care. Diagnoses orders as above. ELENA on CPAP. Patient is a history of obstructive sleep apnea currently on CPAP without concern. Continue current management. Obesity: Chronic, uncontrolled. BMI today 39.16. Patient has attempted and failed multiple lifestyle measures including dietary changes and increasing exercise over the last couple years. Interested in medication assisted therapy. We will plan check blood work first and follow-up for review. Consider Adipex versus GLP-1 therapy. PHQ-9 Total Score: 0 (10/5/2023  3:49 PM)  Negative depression screen    Due for hepatitis C and HIV screen. Return in about 6 weeks (around 2023) for lab review. Subjective   SUBJECTIVE/OBJECTIVE:  28year old male presents to the office to establish as a new patient in discharge, care.   Patient is a pertinent past medical history of sleep apnea, obesity    Multiple chronic medical conditions reviewed today: As stated above    Patient states overall he is a healthy 28year-old

## 2023-10-09 ENCOUNTER — NURSE ONLY (OUTPATIENT)
Dept: FAMILY MEDICINE CLINIC | Age: 33
End: 2023-10-09
Payer: COMMERCIAL

## 2023-10-09 ENCOUNTER — HOSPITAL ENCOUNTER (OUTPATIENT)
Age: 33
Setting detail: SPECIMEN
Discharge: HOME | End: 2023-10-09

## 2023-10-09 DIAGNOSIS — Z00.00 ROUTINE GENERAL MEDICAL EXAMINATION AT A HEALTH CARE FACILITY: ICD-10-CM

## 2023-10-09 DIAGNOSIS — Z11.4 SCREENING FOR HUMAN IMMUNODEFICIENCY VIRUS: ICD-10-CM

## 2023-10-09 DIAGNOSIS — E66.09 EXOGENOUS OBESITY: ICD-10-CM

## 2023-10-09 DIAGNOSIS — Z11.59 SCREENING EXAMINATION FOR POLIOMYELITIS: ICD-10-CM

## 2023-10-09 DIAGNOSIS — Z11.59 ENCOUNTER FOR HCV SCREENING TEST FOR LOW RISK PATIENT: ICD-10-CM

## 2023-10-09 DIAGNOSIS — E66.09 CLASS 2 OBESITY DUE TO EXCESS CALORIES WITHOUT SERIOUS COMORBIDITY WITH BODY MASS INDEX (BMI) OF 39.0 TO 39.9 IN ADULT: ICD-10-CM

## 2023-10-09 DIAGNOSIS — Z00.00 WELL ADULT EXAM: ICD-10-CM

## 2023-10-09 LAB
ALBUMIN SERPL-MCNC: 4.2 G/DL (ref 3.5–5.2)
ALBUMIN/GLOB SERPL: 1 {RATIO} (ref 1–2.5)
ALP SERPL-CCNC: 70 U/L (ref 40–129)
ALT SERPL-CCNC: 108 U/L (ref 10–50)
ANION GAP SERPL CALCULATED.3IONS-SCNC: 10 MMOL/L (ref 9–16)
AST SERPL-CCNC: 46 U/L (ref 10–50)
BASOPHILS # BLD: 0.09 K/UL (ref 0–0.2)
BASOPHILS NFR BLD: 1 % (ref 0–2)
BILIRUB SERPL-MCNC: 0.5 MG/DL (ref 0–1.2)
BUN SERPL-MCNC: 12 MG/DL (ref 6–20)
CALCIUM SERPL-MCNC: 9.5 MG/DL (ref 8.6–10.4)
CHLORIDE SERPL-SCNC: 104 MMOL/L (ref 98–107)
CHOLEST SERPL-MCNC: 222 MG/DL (ref 0–199)
CHOLESTEROL/HDL RATIO: 9
CO2 SERPL-SCNC: 26 MMOL/L (ref 20–31)
CREAT SERPL-MCNC: 0.8 MG/DL (ref 0.7–1.2)
EOSINOPHIL # BLD: 0.34 K/UL (ref 0–0.44)
EOSINOPHILS RELATIVE PERCENT: 4 % (ref 1–4)
ERYTHROCYTE [DISTWIDTH] IN BLOOD BY AUTOMATED COUNT: 12.5 % (ref 11.8–14.4)
GFR SERPL CREATININE-BSD FRML MDRD: >60 ML/MIN/1.73M2
GLUCOSE SERPL-MCNC: 99 MG/DL (ref 74–99)
HCT VFR BLD AUTO: 47.8 % (ref 40.7–50.3)
HCV AB SERPL QL IA: NONREACTIVE
HDLC SERPL-MCNC: 25 MG/DL
HGB BLD-MCNC: 15.9 G/DL (ref 13–17)
IMM GRANULOCYTES # BLD AUTO: <0.03 K/UL (ref 0–0.3)
IMM GRANULOCYTES NFR BLD: 0 %
LDLC SERPL CALC-MCNC: 144 MG/DL (ref 0–100)
LYMPHOCYTES NFR BLD: 3.4 K/UL (ref 1.1–3.7)
LYMPHOCYTES RELATIVE PERCENT: 45 % (ref 24–43)
MCH RBC QN AUTO: 30.1 PG (ref 25.2–33.5)
MCHC RBC AUTO-ENTMCNC: 33.3 G/DL (ref 28.4–34.8)
MCV RBC AUTO: 90.5 FL (ref 82.6–102.9)
MONOCYTES NFR BLD: 0.55 K/UL (ref 0.1–1.2)
MONOCYTES NFR BLD: 7 % (ref 3–12)
NEUTROPHILS NFR BLD: 43 % (ref 36–65)
NEUTS SEG NFR BLD: 3.32 K/UL (ref 1.5–8.1)
NRBC BLD-RTO: 0 PER 100 WBC
PLATELET # BLD AUTO: 302 K/UL (ref 138–453)
PMV BLD AUTO: 10 FL (ref 8.1–13.5)
POTASSIUM SERPL-SCNC: 4.6 MMOL/L (ref 3.7–5.3)
PROT SERPL-MCNC: 7.1 G/DL (ref 6.6–8.7)
RBC # BLD AUTO: 5.28 M/UL (ref 4.21–5.77)
SODIUM SERPL-SCNC: 140 MMOL/L (ref 136–145)
TRIGL SERPL-MCNC: 266 MG/DL (ref 0–149)
TSH SERPL DL<=0.05 MIU/L-ACNC: 1.94 UIU/ML (ref 0.27–4.2)
VLDLC SERPL CALC-MCNC: 53 MG/DL
WBC OTHER # BLD: 7.7 K/UL (ref 3.5–11.3)

## 2023-10-09 PROCEDURE — 36415 COLL VENOUS BLD VENIPUNCTURE: CPT | Performed by: NURSE PRACTITIONER

## 2023-10-10 LAB
EST. AVERAGE GLUCOSE BLD GHB EST-MCNC: 105 MG/DL
HBA1C MFR BLD: 5.3 % (ref 4–6)
HIV 1+2 AB+HIV1 P24 AG SERPL QL IA: NONREACTIVE

## 2023-10-19 ENCOUNTER — OFFICE VISIT (OUTPATIENT)
Dept: FAMILY MEDICINE CLINIC | Age: 33
End: 2023-10-19
Payer: COMMERCIAL

## 2023-10-19 VITALS
HEART RATE: 84 BPM | TEMPERATURE: 97 F | OXYGEN SATURATION: 95 % | DIASTOLIC BLOOD PRESSURE: 98 MMHG | RESPIRATION RATE: 18 BRPM | WEIGHT: 300.2 LBS | BODY MASS INDEX: 39.61 KG/M2 | SYSTOLIC BLOOD PRESSURE: 136 MMHG

## 2023-10-19 DIAGNOSIS — E78.49 OTHER HYPERLIPIDEMIA: ICD-10-CM

## 2023-10-19 DIAGNOSIS — I10 PRIMARY HYPERTENSION: Primary | ICD-10-CM

## 2023-10-19 DIAGNOSIS — E66.09 CLASS 2 OBESITY DUE TO EXCESS CALORIES WITHOUT SERIOUS COMORBIDITY WITH BODY MASS INDEX (BMI) OF 39.0 TO 39.9 IN ADULT: ICD-10-CM

## 2023-10-19 PROCEDURE — 99214 OFFICE O/P EST MOD 30 MIN: CPT | Performed by: STUDENT IN AN ORGANIZED HEALTH CARE EDUCATION/TRAINING PROGRAM

## 2023-10-19 PROCEDURE — 3080F DIAST BP >= 90 MM HG: CPT | Performed by: STUDENT IN AN ORGANIZED HEALTH CARE EDUCATION/TRAINING PROGRAM

## 2023-10-19 PROCEDURE — 3075F SYST BP GE 130 - 139MM HG: CPT | Performed by: STUDENT IN AN ORGANIZED HEALTH CARE EDUCATION/TRAINING PROGRAM

## 2023-10-19 RX ORDER — SEMAGLUTIDE 0.25 MG/.5ML
0.25 INJECTION, SOLUTION SUBCUTANEOUS
Qty: 2 ML | Refills: 0 | Status: SHIPPED | OUTPATIENT
Start: 2023-10-19 | End: 2023-11-18

## 2023-10-19 ASSESSMENT — ENCOUNTER SYMPTOMS
SHORTNESS OF BREATH: 0
NAUSEA: 0
CONSTIPATION: 0
EYE REDNESS: 0
EYE PAIN: 0
DIARRHEA: 0
COUGH: 0
VOMITING: 0
ABDOMINAL PAIN: 0

## 2023-10-19 ASSESSMENT — PATIENT HEALTH QUESTIONNAIRE - PHQ9
SUM OF ALL RESPONSES TO PHQ QUESTIONS 1-9: 0
SUM OF ALL RESPONSES TO PHQ QUESTIONS 1-9: 0
1. LITTLE INTEREST OR PLEASURE IN DOING THINGS: 0
2. FEELING DOWN, DEPRESSED OR HOPELESS: 0
SUM OF ALL RESPONSES TO PHQ9 QUESTIONS 1 & 2: 0
SUM OF ALL RESPONSES TO PHQ QUESTIONS 1-9: 0
SUM OF ALL RESPONSES TO PHQ QUESTIONS 1-9: 0

## 2023-10-19 NOTE — PROGRESS NOTES
Wendy Toth (:  1990) is a 28 y.o. male,Established patient, here for evaluation of the following chief complaint(s):  Discuss Labs (Labs 10/9) and Health Maintenance (Vaccines )         ASSESSMENT/PLAN:  1. Primary hypertension  -     CBC with Auto Differential; Future  -     Comprehensive Metabolic Panel; Future  2. Other hyperlipidemia  -     Lipid Panel; Future  3. Class 2 obesity due to excess calories without serious comorbidity with body mass index (BMI) of 39.0 to 39.9 in adult  -     Semaglutide-Weight Management (WEGOVY) 0.25 MG/0.5ML SOAJ SC injection; Inject 0.25 mg into the skin every 7 days, Disp-2 mL, R-0ormal  27-year-old male presents office to follow-up on multiple chronic medical conditions. Hypertension: Chronic, mildly uncontrolled. Blood pressure 136/98. Most likely due to uncontrolled obesity and weight gain. Patient was educated to work on lifestyle modification. Most recent blood work benign. We will plan to follow-up and if continues to be elevated on follow-up will consider medication assisted therapy at that time. Hyperlipidemia: Chronic, uncontrolled. Patient has elevated cholesterol recent blood draw. Educated on lifestyle changes. We will hold off on statin therapy at this time although if continues to be elevated or worsens we will plan to proceed with medication assisted therapy. Obesity: Chronic, uncontrolled. BMI today 39.61. We will plan to start work on lifestyle changes including diet and exercise. We will also plan to start medication assisted therapy as above. Return in about 6 months (around 2024) for lab review. Subjective   SUBJECTIVE/OBJECTIVE:  27-year-old male presents office to follow-up on multiple chronic medical conditions. Past medical history reviewed today including hypertension, hyperlipidemia, obesity. Also to review recent blood work.     Multiple chronic medical conditions reviewed today: As stated

## 2023-10-24 ENCOUNTER — TELEPHONE (OUTPATIENT)
Dept: FAMILY MEDICINE CLINIC | Age: 33
End: 2023-10-24

## 2023-10-24 NOTE — TELEPHONE ENCOUNTER
Patient called and statd that he was contacted by the pharmacy that his rx of Payam Rain is on back order. He contacted other pharmacies and was told the same. He is asking if there is a substitute that can be prescribed as they are not sure how long Payam Brunette will be on back order. Please advise.

## 2023-10-24 NOTE — TELEPHONE ENCOUNTER
Patient asked if he could trial adipex while waiting for injectable to be back in stock.  Please advise

## 2023-10-26 ENCOUNTER — OFFICE VISIT (OUTPATIENT)
Dept: FAMILY MEDICINE CLINIC | Age: 33
End: 2023-10-26
Payer: COMMERCIAL

## 2023-10-26 VITALS
BODY MASS INDEX: 39.58 KG/M2 | WEIGHT: 300 LBS | DIASTOLIC BLOOD PRESSURE: 86 MMHG | RESPIRATION RATE: 18 BRPM | SYSTOLIC BLOOD PRESSURE: 138 MMHG | TEMPERATURE: 97.2 F | HEART RATE: 80 BPM | OXYGEN SATURATION: 97 %

## 2023-10-26 DIAGNOSIS — E66.09 CLASS 2 OBESITY DUE TO EXCESS CALORIES WITHOUT SERIOUS COMORBIDITY WITH BODY MASS INDEX (BMI) OF 39.0 TO 39.9 IN ADULT: Primary | ICD-10-CM

## 2023-10-26 DIAGNOSIS — Z79.899 MEDICATION THERAPY CHANGED: ICD-10-CM

## 2023-10-26 PROCEDURE — 3079F DIAST BP 80-89 MM HG: CPT | Performed by: STUDENT IN AN ORGANIZED HEALTH CARE EDUCATION/TRAINING PROGRAM

## 2023-10-26 PROCEDURE — 93000 ELECTROCARDIOGRAM COMPLETE: CPT | Performed by: STUDENT IN AN ORGANIZED HEALTH CARE EDUCATION/TRAINING PROGRAM

## 2023-10-26 PROCEDURE — 3075F SYST BP GE 130 - 139MM HG: CPT | Performed by: STUDENT IN AN ORGANIZED HEALTH CARE EDUCATION/TRAINING PROGRAM

## 2023-10-26 PROCEDURE — 80305 DRUG TEST PRSMV DIR OPT OBS: CPT | Performed by: STUDENT IN AN ORGANIZED HEALTH CARE EDUCATION/TRAINING PROGRAM

## 2023-10-26 PROCEDURE — 99214 OFFICE O/P EST MOD 30 MIN: CPT | Performed by: STUDENT IN AN ORGANIZED HEALTH CARE EDUCATION/TRAINING PROGRAM

## 2023-10-26 RX ORDER — PHENTERMINE HYDROCHLORIDE 37.5 MG/1
37.5 TABLET ORAL
Qty: 30 TABLET | Refills: 0 | Status: SHIPPED | OUTPATIENT
Start: 2023-10-26 | End: 2023-11-25

## 2023-10-26 ASSESSMENT — ENCOUNTER SYMPTOMS
DIARRHEA: 0
CONSTIPATION: 0
COUGH: 0
NAUSEA: 0
EYE PAIN: 0
RHINORRHEA: 0
EYE REDNESS: 0
ABDOMINAL PAIN: 0
SHORTNESS OF BREATH: 0
VOMITING: 0

## 2023-10-26 NOTE — PROGRESS NOTES
Pennie Toth (:  1990) is a 28 y.o. male,Established patient, here for evaluation of the following chief complaint(s):  Medication Request (Adipex ) and Health Maintenance (Vaccines )         ASSESSMENT/PLAN:  1. Class 2 obesity due to excess calories without serious comorbidity with body mass index (BMI) of 39.0 to 39.9 in adult  -     phentermine (ADIPEX-P) 37.5 MG tablet; Take 1 tablet by mouth every morning (before breakfast) for 30 days. Max Daily Amount: 37.5 mg, Disp-30 tablet, R-0Normal  2. Medication therapy changed  -     POCT Rapid Drug Screen  -     EKG 12 lead; Future  27-year-old male presents office to follow-up on obesity. Patient has chronic uncontrolled obesity with a BMI of 39.58. Initially we attempted to get GLP-1 injectable therapy covered through insurance secondary to patient's lifestyle changes including increasing exercise and dietary modification did not work for weight loss. Patient's insurance company declined request.  Would like to hold off on bariatric surgery if possible. We will try Adipex therapy for the next 3 months to see if this can help jumpstart weight loss. Plan to follow-up in 1 month for weight recheck. EKG in the office negative, point-of-care rapid drug screening negative, controlled substance agreement updated today in the office. PDMP reviewed and appropriate. No concerns at this time. Return in about 1 month (around 2023) for adipex follow up. Subjective   SUBJECTIVE/OBJECTIVE:  27-year-old male presents the office for obesity. Patient states that at this point he is interested in pursuing medication assisted therapy to help with weight loss. We attempted multiple times to get GLP-1 injectable therapy including [facility] but patient was unable to get these from the pharmacy secondary to short supply or not being covered. At this time patient would like to discuss options and see what else he can do.   He has tried multiple

## 2023-11-28 ENCOUNTER — OFFICE VISIT (OUTPATIENT)
Dept: FAMILY MEDICINE CLINIC | Age: 33
End: 2023-11-28
Payer: COMMERCIAL

## 2023-11-28 VITALS
TEMPERATURE: 97.2 F | BODY MASS INDEX: 37.73 KG/M2 | RESPIRATION RATE: 16 BRPM | HEART RATE: 72 BPM | SYSTOLIC BLOOD PRESSURE: 132 MMHG | WEIGHT: 286 LBS | OXYGEN SATURATION: 97 % | DIASTOLIC BLOOD PRESSURE: 86 MMHG

## 2023-11-28 DIAGNOSIS — E66.09 CLASS 2 OBESITY DUE TO EXCESS CALORIES WITHOUT SERIOUS COMORBIDITY WITH BODY MASS INDEX (BMI) OF 39.0 TO 39.9 IN ADULT: ICD-10-CM

## 2023-11-28 PROCEDURE — 3075F SYST BP GE 130 - 139MM HG: CPT | Performed by: STUDENT IN AN ORGANIZED HEALTH CARE EDUCATION/TRAINING PROGRAM

## 2023-11-28 PROCEDURE — 3079F DIAST BP 80-89 MM HG: CPT | Performed by: STUDENT IN AN ORGANIZED HEALTH CARE EDUCATION/TRAINING PROGRAM

## 2023-11-28 PROCEDURE — 99213 OFFICE O/P EST LOW 20 MIN: CPT | Performed by: STUDENT IN AN ORGANIZED HEALTH CARE EDUCATION/TRAINING PROGRAM

## 2023-11-28 RX ORDER — PHENTERMINE HYDROCHLORIDE 37.5 MG/1
37.5 TABLET ORAL
COMMUNITY

## 2023-11-28 RX ORDER — PHENTERMINE HYDROCHLORIDE 37.5 MG/1
37.5 TABLET ORAL
Qty: 30 TABLET | Refills: 0 | Status: SHIPPED | OUTPATIENT
Start: 2023-11-28 | End: 2023-12-28

## 2023-11-28 ASSESSMENT — ENCOUNTER SYMPTOMS
EYE PAIN: 0
VOMITING: 0
CONSTIPATION: 0
SHORTNESS OF BREATH: 0
ABDOMINAL PAIN: 0
EYE REDNESS: 0
DIARRHEA: 0
NAUSEA: 0
COUGH: 0
RHINORRHEA: 0

## 2023-11-28 NOTE — PROGRESS NOTES
Sola Toth (:  1990) is a 28 y.o. male,Established patient, here for evaluation of the following chief complaint(s):  Medication Check (Adipex 1m f/u) and Health Maintenance (vaccine)         ASSESSMENT/PLAN:  1. Class 2 obesity due to excess calories without serious comorbidity with body mass index (BMI) of 39.0 to 39.9 in adult  -     phentermine (ADIPEX-P) 37.5 MG tablet; Take 1 tablet by mouth every morning (before breakfast) for 30 days. Max Daily Amount: 37.5 mg, Disp-30 tablet, R-0ormal  79-year-old male presents the office to follow-up on obesity/Adipex therapy. Since being started on Adipex last month patient has been able to lose 14 pounds going from 300 pounds down to 286 pounds. Overall has had a dramatic improvement in weight loss with medication. Has had no side effects. PDMP reviewed and appropriate. Patient will continue Adipex therapy at this time and follow-up in the next month for further review medication reevaluation. Return in about 1 month (around 2023) for adipex . Subjective   SUBJECTIVE/OBJECTIVE:  79-year-old male presents office to follow-up on obesity/Adipex therapy. Patient states since starting Adipex has had a dramatic improvement in metabolism, mood, overall with weight loss. Feels like he is finally getting off that plateau. Has been able to go from 300 pounds down to 286 pounds over the past month. Has had on improvement in his lifestyle modification including diet and exercise plan. Overall is pleased with how things are going and is interested in continuing current medication regimen.       Past Medical History:   Diagnosis Date    Asthma     ELENA treated with BiPAP     Other hyperlipidemia 10/19/2023    Primary hypertension 10/19/2023       Past Surgical History:   Procedure Laterality Date    LUMBAR DISCECTOMY  2018       Family History   Problem Relation Age of Onset    Heart Surgery Brother 1        has down syndrome/has had

## 2023-12-29 DIAGNOSIS — E66.09 CLASS 2 OBESITY DUE TO EXCESS CALORIES WITHOUT SERIOUS COMORBIDITY WITH BODY MASS INDEX (BMI) OF 39.0 TO 39.9 IN ADULT: Primary | ICD-10-CM

## 2023-12-29 RX ORDER — PHENTERMINE HYDROCHLORIDE 37.5 MG/1
37.5 TABLET ORAL
Qty: 30 TABLET | Refills: 0 | Status: SHIPPED | OUTPATIENT
Start: 2023-12-29 | End: 2024-01-28

## 2023-12-29 NOTE — TELEPHONE ENCOUNTER
Last Appt. 11/28/2023   Next Appt. 1/4/2024   RX Pended    Patient called.  Stated he was told by Dr. Lola Beach to call in when he needed a refill

## 2024-01-04 ENCOUNTER — OFFICE VISIT (OUTPATIENT)
Dept: FAMILY MEDICINE CLINIC | Age: 34
End: 2024-01-04
Payer: COMMERCIAL

## 2024-01-04 VITALS
WEIGHT: 281 LBS | DIASTOLIC BLOOD PRESSURE: 92 MMHG | HEART RATE: 120 BPM | OXYGEN SATURATION: 97 % | BODY MASS INDEX: 37.07 KG/M2 | SYSTOLIC BLOOD PRESSURE: 136 MMHG | TEMPERATURE: 97.6 F | RESPIRATION RATE: 18 BRPM

## 2024-01-04 DIAGNOSIS — E66.09 CLASS 2 OBESITY DUE TO EXCESS CALORIES WITHOUT SERIOUS COMORBIDITY WITH BODY MASS INDEX (BMI) OF 39.0 TO 39.9 IN ADULT: Primary | ICD-10-CM

## 2024-01-04 DIAGNOSIS — I10 PRIMARY HYPERTENSION: ICD-10-CM

## 2024-01-04 PROCEDURE — 3075F SYST BP GE 130 - 139MM HG: CPT | Performed by: STUDENT IN AN ORGANIZED HEALTH CARE EDUCATION/TRAINING PROGRAM

## 2024-01-04 PROCEDURE — 3080F DIAST BP >= 90 MM HG: CPT | Performed by: STUDENT IN AN ORGANIZED HEALTH CARE EDUCATION/TRAINING PROGRAM

## 2024-01-04 PROCEDURE — 99213 OFFICE O/P EST LOW 20 MIN: CPT | Performed by: STUDENT IN AN ORGANIZED HEALTH CARE EDUCATION/TRAINING PROGRAM

## 2024-01-04 ASSESSMENT — PATIENT HEALTH QUESTIONNAIRE - PHQ9
SUM OF ALL RESPONSES TO PHQ QUESTIONS 1-9: 0
SUM OF ALL RESPONSES TO PHQ QUESTIONS 1-9: 0
SUM OF ALL RESPONSES TO PHQ9 QUESTIONS 1 & 2: 0
SUM OF ALL RESPONSES TO PHQ QUESTIONS 1-9: 0
1. LITTLE INTEREST OR PLEASURE IN DOING THINGS: 0
SUM OF ALL RESPONSES TO PHQ QUESTIONS 1-9: 0
2. FEELING DOWN, DEPRESSED OR HOPELESS: 0

## 2024-01-04 ASSESSMENT — ENCOUNTER SYMPTOMS
EYE REDNESS: 0
DIARRHEA: 0
EYE PAIN: 0
CONSTIPATION: 0
COUGH: 0
SHORTNESS OF BREATH: 0
RHINORRHEA: 0
ABDOMINAL PAIN: 0
NAUSEA: 0
VOMITING: 0

## 2024-01-04 NOTE — PROGRESS NOTES
Andrez Toth (:  1990) is a 33 y.o. male,Established patient, here for evaluation of the following chief complaint(s):  Medication Check (Adipex ) and Health Maintenance (Vaccines )         ASSESSMENT/PLAN:  1. Class 2 obesity due to excess calories without serious comorbidity with body mass index (BMI) of 39.0 to 39.9 in adult  2. Primary hypertension  33-year-old male presents the office to follow-up on obesity and Adipex therapy.  Patient is on month 3 of Adipex therapy and has lost a total of 20 pounds.  Patient denies any side effects medication regimen and is overall pleased with how things are going.  PDMP reviewed and appropriate.  Will plan to update urine drug screen and controlled substance agreement on follow-up.  Continue to follow-up in 1 month for medication reevaluation.    During initial evaluation patient had elevated blood pressure at 148/90 with recheck 136/92.  Patient was also slightly tachycardic initially which came down to 90.  Unclear whether this was due to intermittent elevated blood pressure/pulse versus Adipex adverse reaction.  Will plan to follow-up in 1 month for recheck.  If patient's blood pressure/pulse continues to be elevated will consider Adipex discontinuation versus medication management.  Patient is currently asymptomatic and denies any concerns.    Return in about 1 month (around 2024) for adipex follow up.         Subjective   SUBJECTIVE/OBJECTIVE:  33-year-old male presents the office to follow-up on obesity/Adipex therapy.  Patient states overall he is doing well.  He is lost about 20 pounds since starting Adipex therapy and feels like he still has good energy.  Patient denies any side effects of chest pain, palpitations, shortness of breath.  Overall he feels very good with how things are going and would like to continue current medication regimen if possible.  He is also continue to work on lifestyle changes including increasing

## 2024-01-31 ENCOUNTER — OFFICE VISIT (OUTPATIENT)
Dept: FAMILY MEDICINE CLINIC | Age: 34
End: 2024-01-31
Payer: COMMERCIAL

## 2024-01-31 VITALS
RESPIRATION RATE: 18 BRPM | BODY MASS INDEX: 37.17 KG/M2 | OXYGEN SATURATION: 98 % | WEIGHT: 281.7 LBS | SYSTOLIC BLOOD PRESSURE: 134 MMHG | TEMPERATURE: 97.9 F | DIASTOLIC BLOOD PRESSURE: 88 MMHG | HEART RATE: 72 BPM

## 2024-01-31 DIAGNOSIS — E66.09 CLASS 2 OBESITY DUE TO EXCESS CALORIES WITHOUT SERIOUS COMORBIDITY WITH BODY MASS INDEX (BMI) OF 39.0 TO 39.9 IN ADULT: Primary | ICD-10-CM

## 2024-01-31 LAB
ALCOHOL URINE: ABNORMAL
AMPHETAMINE SCREEN, URINE: POSITIVE
BARBITURATE SCREEN, URINE: NEGATIVE
BENZODIAZEPINE SCREEN, URINE: NEGATIVE
BUPRENORPHINE URINE: ABNORMAL
COCAINE METABOLITE SCREEN URINE: NEGATIVE
FENTANYL SCREEN, URINE: ABNORMAL
GABAPENTIN SCREEN, URINE: ABNORMAL
MDMA URINE: NEGATIVE
METHADONE SCREEN, URINE: NEGATIVE
METHAMPHETAMINE, URINE: NEGATIVE
OPIATE SCREEN URINE: NEGATIVE
OXYCODONE SCREEN URINE: NEGATIVE
PHENCYCLIDINE SCREEN URINE: NEGATIVE
PROPOXYPHENE SCREEN, URINE: ABNORMAL
SYNTHETIC CANNABINOIDS(K2) SCREEN, URINE: ABNORMAL
THC SCREEN, URINE: NEGATIVE
TRAMADOL SCREEN URINE: ABNORMAL
TRICYCLIC ANTIDEPRESSANTS, UR: NEGATIVE

## 2024-01-31 PROCEDURE — 3075F SYST BP GE 130 - 139MM HG: CPT | Performed by: STUDENT IN AN ORGANIZED HEALTH CARE EDUCATION/TRAINING PROGRAM

## 2024-01-31 PROCEDURE — 99213 OFFICE O/P EST LOW 20 MIN: CPT | Performed by: STUDENT IN AN ORGANIZED HEALTH CARE EDUCATION/TRAINING PROGRAM

## 2024-01-31 PROCEDURE — 80305 DRUG TEST PRSMV DIR OPT OBS: CPT | Performed by: STUDENT IN AN ORGANIZED HEALTH CARE EDUCATION/TRAINING PROGRAM

## 2024-01-31 PROCEDURE — 3079F DIAST BP 80-89 MM HG: CPT | Performed by: STUDENT IN AN ORGANIZED HEALTH CARE EDUCATION/TRAINING PROGRAM

## 2024-01-31 RX ORDER — PHENTERMINE HYDROCHLORIDE 37.5 MG/1
37.5 CAPSULE ORAL EVERY MORNING
COMMUNITY
End: 2024-01-31 | Stop reason: SDUPTHER

## 2024-01-31 RX ORDER — PHENTERMINE HYDROCHLORIDE 37.5 MG/1
37.5 CAPSULE ORAL EVERY MORNING
Qty: 30 CAPSULE | Refills: 0 | Status: SHIPPED | OUTPATIENT
Start: 2024-01-31 | End: 2024-03-01

## 2024-01-31 ASSESSMENT — ENCOUNTER SYMPTOMS
NAUSEA: 0
COUGH: 0
VOMITING: 0
CONSTIPATION: 0
SHORTNESS OF BREATH: 0
ABDOMINAL PAIN: 0
DIARRHEA: 0

## 2024-01-31 ASSESSMENT — PATIENT HEALTH QUESTIONNAIRE - PHQ9
1. LITTLE INTEREST OR PLEASURE IN DOING THINGS: 0
SUM OF ALL RESPONSES TO PHQ QUESTIONS 1-9: 0
2. FEELING DOWN, DEPRESSED OR HOPELESS: 0
SUM OF ALL RESPONSES TO PHQ QUESTIONS 1-9: 0
SUM OF ALL RESPONSES TO PHQ9 QUESTIONS 1 & 2: 0

## 2024-01-31 NOTE — PROGRESS NOTES
Andrez Toth (:  1990) is a 33 y.o. male,Established patient, here for evaluation of the following chief complaint(s):  No chief complaint on file.         ASSESSMENT/PLAN:  1. Class 2 obesity due to excess calories without serious comorbidity with body mass index (BMI) of 39.0 to 39.9 in adult  -     POCT Rapid Drug Screen  -     phentermine (ADIPEX-P) 37.5 MG capsule; Take 1 capsule by mouth every morning for 30 days. Max Daily Amount: 37.5 mg, Disp-30 capsule, R-0Normal  33-year-old male presents the office to follow-up on Adipex therapy for obesity/weight loss.  Patient's BMI today 37.17.  He is completed approximately 3 months of medication management going on to monitor for.  Has lost almost 30 pounds in total and has been doing well.  No side effects.  Urine drug screen updated today in the office which is consistent with amphetamines.  PDMP reviewed and appropriate.  Controlled substance agreement updated in the office today.  Will plan to continue for another month and follow-up in 3 days.     24 16:12   Amphetamine Screen, Urine Positive !   Barbiturate Screen, Urine Negative   Benzodiazepine Screen, Urine Negative   METHADONE SCREEN, URINE, 75952 Negative   METHAMPHETAMINE,URINE Negative   Opiate Scrn, Ur Negative   Oxycodone Screen, Ur Negative   PCP Screen, Urine Negative   Tricyclic Antidepressants, Urine Negative   Cocaine Metabolite Screen, Urine Negative   MDMA, Urine Negative   !: Data is abnormal      Return in about 1 month (around 2024) for medication follow up.         Subjective   SUBJECTIVE/OBJECTIVE:  33-year-old male presents the office to follow-up on obesity/weight loss.  Patient states that Adipex has been going well.  This month he has lost a lot of weight but has had to buy new clothes/new belt and feels like he is feeling better close as well as being more physically active.  Overall since starting approximately 3 months ago he is lost almost 30 pounds and

## 2024-02-27 ENCOUNTER — OFFICE VISIT (OUTPATIENT)
Dept: FAMILY MEDICINE CLINIC | Age: 34
End: 2024-02-27
Payer: COMMERCIAL

## 2024-02-27 VITALS
RESPIRATION RATE: 16 BRPM | TEMPERATURE: 97.7 F | BODY MASS INDEX: 36.28 KG/M2 | WEIGHT: 275 LBS | OXYGEN SATURATION: 99 % | SYSTOLIC BLOOD PRESSURE: 142 MMHG | HEART RATE: 98 BPM | DIASTOLIC BLOOD PRESSURE: 100 MMHG

## 2024-02-27 DIAGNOSIS — E66.09 CLASS 2 OBESITY DUE TO EXCESS CALORIES WITHOUT SERIOUS COMORBIDITY WITH BODY MASS INDEX (BMI) OF 39.0 TO 39.9 IN ADULT: Primary | ICD-10-CM

## 2024-02-27 DIAGNOSIS — I10 PRIMARY HYPERTENSION: ICD-10-CM

## 2024-02-27 PROCEDURE — 99213 OFFICE O/P EST LOW 20 MIN: CPT | Performed by: STUDENT IN AN ORGANIZED HEALTH CARE EDUCATION/TRAINING PROGRAM

## 2024-02-27 PROCEDURE — 3077F SYST BP >= 140 MM HG: CPT | Performed by: STUDENT IN AN ORGANIZED HEALTH CARE EDUCATION/TRAINING PROGRAM

## 2024-02-27 PROCEDURE — 3080F DIAST BP >= 90 MM HG: CPT | Performed by: STUDENT IN AN ORGANIZED HEALTH CARE EDUCATION/TRAINING PROGRAM

## 2024-02-27 RX ORDER — PHENTERMINE HYDROCHLORIDE 37.5 MG/1
37.5 CAPSULE ORAL EVERY MORNING
Qty: 30 CAPSULE | Refills: 0 | Status: SHIPPED | OUTPATIENT
Start: 2024-02-27 | End: 2024-03-28

## 2024-02-27 ASSESSMENT — ENCOUNTER SYMPTOMS
NAUSEA: 0
CONSTIPATION: 0
RHINORRHEA: 0
DIARRHEA: 0
EYE REDNESS: 0
COUGH: 0
EYE PAIN: 0
SHORTNESS OF BREATH: 0
VOMITING: 0
ABDOMINAL PAIN: 0

## 2024-02-27 NOTE — PROGRESS NOTES
Andrez Toth (:  1990) is a 33 y.o. male,Established patient, here for evaluation of the following chief complaint(s):  Medication Check (Adipex ) and Health Maintenance (Vaccines /)         ASSESSMENT/PLAN:  1. Class 2 obesity due to excess calories without serious comorbidity with body mass index (BMI) of 39.0 to 39.9 in adult  -     phentermine (ADIPEX-P) 37.5 MG capsule; Take 1 capsule by mouth every morning for 30 days. Max Daily Amount: 37.5 mg, Disp-30 capsule, R-0Normal  2. Primary hypertension  33-year-old male presents to the office to follow-up on obesity/Adipex therapy.  Patient is currently getting ready to start fifth month of Adipex therapy completed 4 months and has lost 25 pounds total since starting medication with 6 pounds over the past month.  Continue to work on lifestyle modification including diet and exercise.  Continue to work on weight loss.  Patient did have mildly elevated blood pressure today on exam but no acute concerns.  Continue to monitor at home.  No need for medication assisted therapy.  Will plan for recheck of blood work in early April after Adipex therapy has been completed for chronic condition review and recheck on cholesterol after weight loss.    -PDMP reviewed and appropriate    Return in about 1 month (around 3/27/2024) for adipex follow up.         Subjective   SUBJECTIVE/OBJECTIVE:  33-year-old male presents the office to follow-up on obesity/Adipex therapy.    Multiple chronic medical conditions reviewed today: As stated above    Patient has no concerns this time states medication is working well.  Denies any side effects medication regimen.  Has lost about 6 pounds since last visit and overall is doing well per patient report.  Patient like to continue current medication regimen if possible.    Medications were reviewed in detail. Concerns about medications today: No concerns      Past Medical History:   Diagnosis Date    Asthma     ELENA treated with BiPAP

## 2024-03-07 ENCOUNTER — OFFICE VISIT (OUTPATIENT)
Dept: PULMONOLOGY | Age: 34
End: 2024-03-07
Payer: COMMERCIAL

## 2024-03-07 VITALS
OXYGEN SATURATION: 97 % | SYSTOLIC BLOOD PRESSURE: 144 MMHG | DIASTOLIC BLOOD PRESSURE: 88 MMHG | HEART RATE: 86 BPM | TEMPERATURE: 98 F | BODY MASS INDEX: 36.66 KG/M2 | HEIGHT: 73 IN | WEIGHT: 276.6 LBS

## 2024-03-07 DIAGNOSIS — G47.33 OSA TREATED WITH BIPAP: Primary | ICD-10-CM

## 2024-03-07 DIAGNOSIS — E66.9 OBESITY (BMI 30-39.9): ICD-10-CM

## 2024-03-07 DIAGNOSIS — I10 PRIMARY HYPERTENSION: ICD-10-CM

## 2024-03-07 PROCEDURE — 3077F SYST BP >= 140 MM HG: CPT | Performed by: NURSE PRACTITIONER

## 2024-03-07 PROCEDURE — 99214 OFFICE O/P EST MOD 30 MIN: CPT | Performed by: NURSE PRACTITIONER

## 2024-03-07 PROCEDURE — 3079F DIAST BP 80-89 MM HG: CPT | Performed by: NURSE PRACTITIONER

## 2024-03-07 NOTE — PROGRESS NOTES
[facility]      Andrez Toth         [id number]  3/7/2024   Chief Complaint   Patient presents with    Follow-up     Brett 1 yr         Pt of Dr. Ivan IRWIN Download:   Original or initial AHI: 60.2     Date of initial study: 6.18.19      Compliant  73%     Noncompliant 27 %     PAP Type spont Level  15/11   Avg Hrs/Day 4 hr 47min  AHI: 1.9   Leaks : 95 th percentile: 5.8   Recorded compliance dates , 2.5.24-3.5.24     Machine/Mfg:   [x] ResMed    [] Respironics/Dreamstation   Interface:   [] Nasal    [] Nasal pillows   [x] [employer]FM      Provider:      [] -SHEILA     []Maira     [] [employer]    [x] [employer] : [place]    [] [facility]               [] [employer] [facility]      [] [employer]    [] [place]:      [] Other    Neck Size: 19.25  Mallampati 2  ESS:  6  SAQLI: 88    Here is a scan of the most recent download:                  Presentation:   Andrez presents for 1 yearsle medicine follow up for obstructive sleep apnea  Since the last visit, Andrez continues to see benefit with BIPAP , 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   He started on Adipex approximately 6 months ago.  This is being managed by his primary care doctor.  Does not denies any adverse side effects.  He has been successful in losing 30 pounds.      Progress History:   Since last visit any new medical issues? No  Any trouble with Machine No  Any new sleep medicines? no  Trouble Falling Asleep No  Trouble Staying Asleep No  Snoring no    Equipment issues:  The pressure is  acceptable, the mask is acceptable     Review of Systems -   Review of Systems   Constitutional:  Positive for fatigue.   Musculoskeletal:  Positive for arthralgias.   All other systems reviewed and are negative.       Physical Exam:    BMI:  Body mass index is 36.49 kg/m².    Wt Readings from Last 3 Encounters:   03/07/24 125.5 kg (276 lb 9.6 oz)   02/27/24 124.7 kg (275 lb)

## 2024-03-12 ENCOUNTER — NURSE ONLY (OUTPATIENT)
Dept: FAMILY MEDICINE CLINIC | Age: 34
End: 2024-03-12

## 2024-03-12 VITALS — DIASTOLIC BLOOD PRESSURE: 88 MMHG | SYSTOLIC BLOOD PRESSURE: 138 MMHG

## 2024-03-12 DIAGNOSIS — Z01.30 BLOOD PRESSURE CHECK: Primary | ICD-10-CM

## 2024-03-12 PROCEDURE — 99999 PR OFFICE/OUTPT VISIT,PROCEDURE ONLY: CPT | Performed by: STUDENT IN AN ORGANIZED HEALTH CARE EDUCATION/TRAINING PROGRAM

## 2024-03-26 ENCOUNTER — OFFICE VISIT (OUTPATIENT)
Dept: FAMILY MEDICINE CLINIC | Age: 34
End: 2024-03-26
Payer: COMMERCIAL

## 2024-03-26 ENCOUNTER — NURSE ONLY (OUTPATIENT)
Dept: FAMILY MEDICINE CLINIC | Age: 34
End: 2024-03-26
Payer: COMMERCIAL

## 2024-03-26 VITALS
BODY MASS INDEX: 36.6 KG/M2 | DIASTOLIC BLOOD PRESSURE: 86 MMHG | RESPIRATION RATE: 18 BRPM | TEMPERATURE: 97.2 F | OXYGEN SATURATION: 98 % | SYSTOLIC BLOOD PRESSURE: 132 MMHG | HEART RATE: 93 BPM | WEIGHT: 277.4 LBS

## 2024-03-26 DIAGNOSIS — E78.49 OTHER HYPERLIPIDEMIA: ICD-10-CM

## 2024-03-26 DIAGNOSIS — I10 PRIMARY HYPERTENSION: ICD-10-CM

## 2024-03-26 DIAGNOSIS — E78.49 OTHER HYPERLIPIDEMIA: Primary | ICD-10-CM

## 2024-03-26 DIAGNOSIS — E66.09 CLASS 2 OBESITY DUE TO EXCESS CALORIES WITHOUT SERIOUS COMORBIDITY WITH BODY MASS INDEX (BMI) OF 39.0 TO 39.9 IN ADULT: Primary | ICD-10-CM

## 2024-03-26 LAB
ALBUMIN SERPL BCG-MCNC: 4.5 G/DL (ref 3.5–5.1)
ALP SERPL-CCNC: 70 U/L (ref 38–126)
ALT SERPL W/O P-5'-P-CCNC: 52 U/L (ref 11–66)
ANION GAP SERPL CALC-SCNC: 13 MEQ/L (ref 8–16)
AST SERPL-CCNC: 24 U/L (ref 5–40)
BASOPHILS ABSOLUTE: 0.1 THOU/MM3 (ref 0–0.1)
BASOPHILS NFR BLD AUTO: 0.9 %
BILIRUB SERPL-MCNC: 0.2 MG/DL (ref 0.3–1.2)
BUN SERPL-MCNC: 14 MG/DL (ref 7–22)
CALCIUM SERPL-MCNC: 9.5 MG/DL (ref 8.5–10.5)
CHLORIDE SERPL-SCNC: 100 MEQ/L (ref 98–111)
CHOLEST SERPL-MCNC: 234 MG/DL (ref 100–199)
CO2 SERPL-SCNC: 26 MEQ/L (ref 23–33)
CREAT SERPL-MCNC: 0.9 MG/DL (ref 0.4–1.2)
DEPRECATED RDW RBC AUTO: 40.4 FL (ref 35–45)
EOSINOPHIL NFR BLD AUTO: 4 %
EOSINOPHILS ABSOLUTE: 0.4 THOU/MM3 (ref 0–0.4)
ERYTHROCYTE [DISTWIDTH] IN BLOOD BY AUTOMATED COUNT: 12.2 % (ref 11.5–14.5)
GFR SERPL CREATININE-BSD FRML MDRD: > 90 ML/MIN/1.73M2
GLUCOSE SERPL-MCNC: 96 MG/DL (ref 70–108)
HCT VFR BLD AUTO: 47.7 % (ref 42–52)
HDLC SERPL-MCNC: 30 MG/DL
HGB BLD-MCNC: 16.1 GM/DL (ref 14–18)
IMM GRANULOCYTES # BLD AUTO: 0.02 THOU/MM3 (ref 0–0.07)
IMM GRANULOCYTES NFR BLD AUTO: 0.2 %
LDLC SERPL CALC-MCNC: 163 MG/DL
LYMPHOCYTES ABSOLUTE: 3.8 THOU/MM3 (ref 1–4.8)
LYMPHOCYTES NFR BLD AUTO: 43.7 %
MCH RBC QN AUTO: 30.6 PG (ref 26–33)
MCHC RBC AUTO-ENTMCNC: 33.8 GM/DL (ref 32.2–35.5)
MCV RBC AUTO: 90.7 FL (ref 80–94)
MONOCYTES ABSOLUTE: 0.6 THOU/MM3 (ref 0.4–1.3)
MONOCYTES NFR BLD AUTO: 6.7 %
NEUTROPHILS NFR BLD AUTO: 44.5 %
NRBC BLD AUTO-RTO: 0 /100 WBC
PLATELET # BLD AUTO: 290 THOU/MM3 (ref 130–400)
PMV BLD AUTO: 10.1 FL (ref 9.4–12.4)
POTASSIUM SERPL-SCNC: 4.6 MEQ/L (ref 3.5–5.2)
PROT SERPL-MCNC: 7.7 G/DL (ref 6.1–8)
RBC # BLD AUTO: 5.26 MILL/MM3 (ref 4.7–6.1)
SEGMENTED NEUTROPHILS ABSOLUTE COUNT: 3.9 THOU/MM3 (ref 1.8–7.7)
SODIUM SERPL-SCNC: 139 MEQ/L (ref 135–145)
TRIGL SERPL-MCNC: 205 MG/DL (ref 0–199)
WBC # BLD AUTO: 8.8 THOU/MM3 (ref 4.8–10.8)

## 2024-03-26 PROCEDURE — 3075F SYST BP GE 130 - 139MM HG: CPT | Performed by: STUDENT IN AN ORGANIZED HEALTH CARE EDUCATION/TRAINING PROGRAM

## 2024-03-26 PROCEDURE — 99213 OFFICE O/P EST LOW 20 MIN: CPT | Performed by: STUDENT IN AN ORGANIZED HEALTH CARE EDUCATION/TRAINING PROGRAM

## 2024-03-26 PROCEDURE — 3079F DIAST BP 80-89 MM HG: CPT | Performed by: STUDENT IN AN ORGANIZED HEALTH CARE EDUCATION/TRAINING PROGRAM

## 2024-03-26 PROCEDURE — 36415 COLL VENOUS BLD VENIPUNCTURE: CPT | Performed by: STUDENT IN AN ORGANIZED HEALTH CARE EDUCATION/TRAINING PROGRAM

## 2024-03-26 RX ORDER — PHENTERMINE HYDROCHLORIDE 37.5 MG/1
37.5 CAPSULE ORAL EVERY MORNING
Qty: 30 CAPSULE | Refills: 0 | Status: SHIPPED | OUTPATIENT
Start: 2024-03-26 | End: 2024-04-25

## 2024-03-26 SDOH — ECONOMIC STABILITY: INCOME INSECURITY: HOW HARD IS IT FOR YOU TO PAY FOR THE VERY BASICS LIKE FOOD, HOUSING, MEDICAL CARE, AND HEATING?: NOT HARD AT ALL

## 2024-03-26 SDOH — ECONOMIC STABILITY: FOOD INSECURITY: WITHIN THE PAST 12 MONTHS, YOU WORRIED THAT YOUR FOOD WOULD RUN OUT BEFORE YOU GOT MONEY TO BUY MORE.: NEVER TRUE

## 2024-03-26 SDOH — ECONOMIC STABILITY: FOOD INSECURITY: WITHIN THE PAST 12 MONTHS, THE FOOD YOU BOUGHT JUST DIDN'T LAST AND YOU DIDN'T HAVE MONEY TO GET MORE.: NEVER TRUE

## 2024-03-26 ASSESSMENT — PATIENT HEALTH QUESTIONNAIRE - PHQ9
SUM OF ALL RESPONSES TO PHQ9 QUESTIONS 1 & 2: 0
1. LITTLE INTEREST OR PLEASURE IN DOING THINGS: NOT AT ALL
SUM OF ALL RESPONSES TO PHQ QUESTIONS 1-9: 0
2. FEELING DOWN, DEPRESSED OR HOPELESS: NOT AT ALL
SUM OF ALL RESPONSES TO PHQ QUESTIONS 1-9: 0

## 2024-03-26 ASSESSMENT — ENCOUNTER SYMPTOMS
DIARRHEA: 0
COUGH: 0
SHORTNESS OF BREATH: 0
CONSTIPATION: 0
VOMITING: 0
ABDOMINAL PAIN: 0
NAUSEA: 0

## 2024-03-26 NOTE — PROGRESS NOTES
Andrez Toth (:  1990) is a 34 y.o. male,Established patient, here for evaluation of the following chief complaint(s):  Adipex (1 month follow up ) and Health Maintenance (Vaccines; )         ASSESSMENT/PLAN:  1. Class 2 obesity due to excess calories without serious comorbidity with body mass index (BMI) of 39.0 to 39.9 in adult  -     phentermine (ADIPEX-P) 37.5 MG capsule; Take 1 capsule by mouth every morning for 30 days. Max Daily Amount: 37.5 mg, Disp-30 capsule, R-0Normal  34-year-old male presents office to follow-up on Adipex therapy.  Patient is currently starting his 6-month of Adipex therapy and is down almost 30 pounds from initiation.  Overall doing fine.  Will finish off final month and then follow-up in 6 months for chronic condition review.  No concerns at this time.    Return in about 6 months (around 2024) for chronic condition review.         Subjective   SUBJECTIVE/OBJECTIVE:  34-year-old male presents the office to follow-up on Adipex therapy.  Patient states that medication is going well.  Has not lost a whole lot from last month this month but also has not experienced any side effects.  He is interested in finishing his final/sixth month of therapy.  Patient has lost a total of about 30 pounds since starting medication management and like to see if he could benefit any additional before he discontinues it.  Denies any questions or concerns at this time.      Past Medical History:   Diagnosis Date    Asthma     ELENA treated with BiPAP     Other hyperlipidemia 10/19/2023    Primary hypertension 10/19/2023       Past Surgical History:   Procedure Laterality Date    LUMBAR DISCECTOMY  2018       Family History   Problem Relation Age of Onset    Heart Surgery Brother 1        has down syndrome/has had several since birth.    Sleep Apnea Mother        Social History     Tobacco Use    Smoking status: Never    Smokeless tobacco: Never   Substance Use Topics    Alcohol use: Yes

## 2024-03-26 NOTE — PROGRESS NOTES
Venipuncture obtained from  right arm. Patient tolerated the procedure without complications or complaints.    1 PST   1 SST   2 LVV

## 2024-04-16 NOTE — PROGRESS NOTES
Health Maintenance Due   Topic Date Due    Hepatitis B vaccine (1 of 3 - 3-dose series) Never done    Varicella vaccine (1 of 2 - 2-dose childhood series) Never done    Polio vaccine (4 of 4 - 4-dose series) 03/23/1993    COVID-19 Vaccine (2 - 2023-24 season) 09/01/2023

## 2024-04-18 ENCOUNTER — OFFICE VISIT (OUTPATIENT)
Dept: FAMILY MEDICINE CLINIC | Age: 34
End: 2024-04-18
Payer: COMMERCIAL

## 2024-04-18 VITALS
DIASTOLIC BLOOD PRESSURE: 88 MMHG | OXYGEN SATURATION: 97 % | WEIGHT: 279.6 LBS | BODY MASS INDEX: 36.89 KG/M2 | SYSTOLIC BLOOD PRESSURE: 138 MMHG | HEART RATE: 91 BPM | RESPIRATION RATE: 16 BRPM | TEMPERATURE: 98.1 F

## 2024-04-18 DIAGNOSIS — Z00.00 WELL ADULT EXAM: Primary | ICD-10-CM

## 2024-04-18 DIAGNOSIS — E78.49 OTHER HYPERLIPIDEMIA: ICD-10-CM

## 2024-04-18 DIAGNOSIS — I10 PRIMARY HYPERTENSION: ICD-10-CM

## 2024-04-18 DIAGNOSIS — E66.09 CLASS 2 OBESITY DUE TO EXCESS CALORIES WITHOUT SERIOUS COMORBIDITY WITH BODY MASS INDEX (BMI) OF 39.0 TO 39.9 IN ADULT: ICD-10-CM

## 2024-04-18 DIAGNOSIS — G47.33 OSA ON CPAP: ICD-10-CM

## 2024-04-18 PROCEDURE — 3079F DIAST BP 80-89 MM HG: CPT | Performed by: STUDENT IN AN ORGANIZED HEALTH CARE EDUCATION/TRAINING PROGRAM

## 2024-04-18 PROCEDURE — 99395 PREV VISIT EST AGE 18-39: CPT | Performed by: STUDENT IN AN ORGANIZED HEALTH CARE EDUCATION/TRAINING PROGRAM

## 2024-04-18 PROCEDURE — 3075F SYST BP GE 130 - 139MM HG: CPT | Performed by: STUDENT IN AN ORGANIZED HEALTH CARE EDUCATION/TRAINING PROGRAM

## 2024-04-18 SDOH — ECONOMIC STABILITY: FOOD INSECURITY: WITHIN THE PAST 12 MONTHS, THE FOOD YOU BOUGHT JUST DIDN'T LAST AND YOU DIDN'T HAVE MONEY TO GET MORE.: NEVER TRUE

## 2024-04-18 SDOH — ECONOMIC STABILITY: INCOME INSECURITY: HOW HARD IS IT FOR YOU TO PAY FOR THE VERY BASICS LIKE FOOD, HOUSING, MEDICAL CARE, AND HEATING?: NOT HARD AT ALL

## 2024-04-18 SDOH — ECONOMIC STABILITY: FOOD INSECURITY: WITHIN THE PAST 12 MONTHS, YOU WORRIED THAT YOUR FOOD WOULD RUN OUT BEFORE YOU GOT MONEY TO BUY MORE.: NEVER TRUE

## 2024-04-18 ASSESSMENT — PATIENT HEALTH QUESTIONNAIRE - PHQ9
SUM OF ALL RESPONSES TO PHQ QUESTIONS 1-9: 0
SUM OF ALL RESPONSES TO PHQ9 QUESTIONS 1 & 2: 0
1. LITTLE INTEREST OR PLEASURE IN DOING THINGS: NOT AT ALL
SUM OF ALL RESPONSES TO PHQ QUESTIONS 1-9: 0
2. FEELING DOWN, DEPRESSED OR HOPELESS: NOT AT ALL
SUM OF ALL RESPONSES TO PHQ QUESTIONS 1-9: 0
SUM OF ALL RESPONSES TO PHQ QUESTIONS 1-9: 0

## 2024-04-18 NOTE — PROGRESS NOTES
Andrez Toth (:  1990) is a 34 y.o. male,Established patient, here for evaluation of the following chief complaint(s):  Discuss Labs (Discuss labs taken on 3/26) and Health Maintenance (See note)      Assessment & Plan   ASSESSMENT/PLAN:  1. Well adult exam  2. Primary hypertension  -     CBC with Auto Differential; Future  -     Comprehensive Metabolic Panel; Future  3. ELENA on CPAP  4. Class 2 obesity due to excess calories without serious comorbidity with body mass index (BMI) of 39.0 to 39.9 in adult  -     Hemoglobin A1C; Future  5. Other hyperlipidemia  -     Lipid Panel; Future  34-year-old male presents the office for yearly well exam.    Patient has a past medical history of hypertension, ELENA on CPAP, obesity, hyperlipidemia.  Patient continues have elevated cholesterol but we will continue to monitor.  Offered medication assisted therapy but declines.  Hypertension well-controlled on diet/lifestyle modification.  Continue to monitor blood pressure at home.  Continue CPAP for obstructive sleep apnea.  Continue to work on overall weight loss including diet and exercise..  Patient is finish off last couple days of Adipex therapy and will need to do a 6-month washout period before reinitiation.  Will plan check repeat blood work yearly or sooner if needed.      Return in about 1 year (around 2025) for yearly wellness.         Subjective   SUBJECTIVE/OBJECTIVE:  34-year-old male presents the office for yearly well exam.  Past medical history reviewed today including hypertension, hyperlipidemia, ELENA on CPAP, obesity    Multiple chronic medical conditions reviewed today: As stated above    Patient denies any concerns this time and states overall he is doing well.  He is finishing off his Adipex therapy in the next couple of days for weight loss which has been pretty good for him.  He potentially is interested in reinitiating when he is able to in 6 months.  Otherwise has no concerns with review

## 2024-04-19 ASSESSMENT — ENCOUNTER SYMPTOMS
EYE PAIN: 0
CONSTIPATION: 0
DIARRHEA: 0
ABDOMINAL PAIN: 0
NAUSEA: 0
COUGH: 0
SHORTNESS OF BREATH: 0
EYE REDNESS: 0
RHINORRHEA: 0
VOMITING: 0

## 2024-09-26 ENCOUNTER — OFFICE VISIT (OUTPATIENT)
Dept: FAMILY MEDICINE CLINIC | Age: 34
End: 2024-09-26
Payer: COMMERCIAL

## 2024-09-26 VITALS
DIASTOLIC BLOOD PRESSURE: 92 MMHG | SYSTOLIC BLOOD PRESSURE: 158 MMHG | HEIGHT: 73 IN | OXYGEN SATURATION: 96 % | TEMPERATURE: 98.4 F | BODY MASS INDEX: 39.2 KG/M2 | RESPIRATION RATE: 18 BRPM | WEIGHT: 295.8 LBS | HEART RATE: 84 BPM

## 2024-09-26 DIAGNOSIS — E66.09 CLASS 2 OBESITY DUE TO EXCESS CALORIES WITHOUT SERIOUS COMORBIDITY WITH BODY MASS INDEX (BMI) OF 39.0 TO 39.9 IN ADULT: ICD-10-CM

## 2024-09-26 DIAGNOSIS — I10 PRIMARY HYPERTENSION: Primary | ICD-10-CM

## 2024-09-26 DIAGNOSIS — E78.49 OTHER HYPERLIPIDEMIA: ICD-10-CM

## 2024-09-26 DIAGNOSIS — G47.33 OSA ON CPAP: ICD-10-CM

## 2024-09-26 PROCEDURE — 99214 OFFICE O/P EST MOD 30 MIN: CPT | Performed by: STUDENT IN AN ORGANIZED HEALTH CARE EDUCATION/TRAINING PROGRAM

## 2024-09-26 PROCEDURE — 3080F DIAST BP >= 90 MM HG: CPT | Performed by: STUDENT IN AN ORGANIZED HEALTH CARE EDUCATION/TRAINING PROGRAM

## 2024-09-26 PROCEDURE — 3077F SYST BP >= 140 MM HG: CPT | Performed by: STUDENT IN AN ORGANIZED HEALTH CARE EDUCATION/TRAINING PROGRAM

## 2024-09-26 RX ORDER — TIRZEPATIDE 2.5 MG/.5ML
2.5 INJECTION, SOLUTION SUBCUTANEOUS WEEKLY
Qty: 2 ML | Refills: 0 | Status: SHIPPED | OUTPATIENT
Start: 2024-09-26 | End: 2024-10-26

## 2024-09-26 SDOH — ECONOMIC STABILITY: FOOD INSECURITY: WITHIN THE PAST 12 MONTHS, THE FOOD YOU BOUGHT JUST DIDN'T LAST AND YOU DIDN'T HAVE MONEY TO GET MORE.: NEVER TRUE

## 2024-09-26 SDOH — ECONOMIC STABILITY: FOOD INSECURITY: WITHIN THE PAST 12 MONTHS, YOU WORRIED THAT YOUR FOOD WOULD RUN OUT BEFORE YOU GOT MONEY TO BUY MORE.: NEVER TRUE

## 2024-09-26 SDOH — ECONOMIC STABILITY: INCOME INSECURITY: HOW HARD IS IT FOR YOU TO PAY FOR THE VERY BASICS LIKE FOOD, HOUSING, MEDICAL CARE, AND HEATING?: NOT HARD AT ALL

## 2024-09-26 ASSESSMENT — ENCOUNTER SYMPTOMS
COUGH: 0
DIARRHEA: 0
VOMITING: 0
EYE REDNESS: 0
CONSTIPATION: 0
EYE PAIN: 0
SHORTNESS OF BREATH: 0
NAUSEA: 0
ABDOMINAL PAIN: 0

## 2024-09-26 ASSESSMENT — PATIENT HEALTH QUESTIONNAIRE - PHQ9
2. FEELING DOWN, DEPRESSED OR HOPELESS: NOT AT ALL
SUM OF ALL RESPONSES TO PHQ QUESTIONS 1-9: 0
SUM OF ALL RESPONSES TO PHQ9 QUESTIONS 1 & 2: 0
SUM OF ALL RESPONSES TO PHQ QUESTIONS 1-9: 0
1. LITTLE INTEREST OR PLEASURE IN DOING THINGS: NOT AT ALL

## 2024-10-07 ENCOUNTER — TELEPHONE (OUTPATIENT)
Dept: FAMILY MEDICINE CLINIC | Age: 34
End: 2024-10-07

## 2024-10-07 NOTE — TELEPHONE ENCOUNTER
Pt states that insurance wont cover any Weight loss meds- so he is wondering if theres anything else he can try-

## 2024-10-07 NOTE — TELEPHONE ENCOUNTER
I attempted to run a PA for Zepbound - insurance states that this is excluded from plan. Please advise

## 2024-10-07 NOTE — TELEPHONE ENCOUNTER
Patient called back - I informed him to ask the insurance what they cover and he verbalized he would call and have that conversation and let us know.

## 2024-10-11 DIAGNOSIS — E66.09 CLASS 2 OBESITY DUE TO EXCESS CALORIES WITHOUT SERIOUS COMORBIDITY WITH BODY MASS INDEX (BMI) OF 39.0 TO 39.9 IN ADULT: Primary | ICD-10-CM

## 2024-10-11 DIAGNOSIS — E66.812 CLASS 2 OBESITY DUE TO EXCESS CALORIES WITHOUT SERIOUS COMORBIDITY WITH BODY MASS INDEX (BMI) OF 39.0 TO 39.9 IN ADULT: Primary | ICD-10-CM

## 2024-10-11 RX ORDER — PHENTERMINE HYDROCHLORIDE 37.5 MG/1
37.5 TABLET ORAL
Qty: 30 TABLET | Refills: 0 | Status: SHIPPED | OUTPATIENT
Start: 2024-10-11 | End: 2024-11-10

## 2024-11-27 ENCOUNTER — OFFICE VISIT (OUTPATIENT)
Dept: FAMILY MEDICINE CLINIC | Age: 34
End: 2024-11-27
Payer: COMMERCIAL

## 2024-11-27 VITALS
SYSTOLIC BLOOD PRESSURE: 138 MMHG | HEART RATE: 97 BPM | TEMPERATURE: 97.2 F | OXYGEN SATURATION: 96 % | RESPIRATION RATE: 18 BRPM | BODY MASS INDEX: 37.87 KG/M2 | WEIGHT: 287 LBS | DIASTOLIC BLOOD PRESSURE: 92 MMHG

## 2024-11-27 DIAGNOSIS — E66.09 CLASS 2 OBESITY DUE TO EXCESS CALORIES WITHOUT SERIOUS COMORBIDITY WITH BODY MASS INDEX (BMI) OF 39.0 TO 39.9 IN ADULT: Primary | ICD-10-CM

## 2024-11-27 DIAGNOSIS — J06.9 VIRAL URI: ICD-10-CM

## 2024-11-27 DIAGNOSIS — E66.812 CLASS 2 OBESITY DUE TO EXCESS CALORIES WITHOUT SERIOUS COMORBIDITY WITH BODY MASS INDEX (BMI) OF 39.0 TO 39.9 IN ADULT: Primary | ICD-10-CM

## 2024-11-27 PROCEDURE — 99214 OFFICE O/P EST MOD 30 MIN: CPT | Performed by: STUDENT IN AN ORGANIZED HEALTH CARE EDUCATION/TRAINING PROGRAM

## 2024-11-27 PROCEDURE — 3075F SYST BP GE 130 - 139MM HG: CPT | Performed by: STUDENT IN AN ORGANIZED HEALTH CARE EDUCATION/TRAINING PROGRAM

## 2024-11-27 PROCEDURE — 3080F DIAST BP >= 90 MM HG: CPT | Performed by: STUDENT IN AN ORGANIZED HEALTH CARE EDUCATION/TRAINING PROGRAM

## 2024-11-27 RX ORDER — PHENTERMINE HYDROCHLORIDE 37.5 MG/1
37.5 TABLET ORAL
Qty: 30 TABLET | Refills: 0 | Status: SHIPPED | OUTPATIENT
Start: 2024-11-27 | End: 2024-12-27

## 2024-11-27 RX ORDER — FLUTICASONE PROPIONATE 50 MCG
2 SPRAY, SUSPENSION (ML) NASAL 2 TIMES DAILY
Qty: 16 G | Refills: 0 | Status: SHIPPED | OUTPATIENT
Start: 2024-11-27

## 2024-11-27 RX ORDER — BROMPHENIRAMINE MALEATE, PSEUDOEPHEDRINE HYDROCHLORIDE, AND DEXTROMETHORPHAN HYDROBROMIDE 2; 30; 10 MG/5ML; MG/5ML; MG/5ML
5 SYRUP ORAL 3 TIMES DAILY PRN
Qty: 118 ML | Refills: 0 | Status: SHIPPED | OUTPATIENT
Start: 2024-11-27 | End: 2024-12-05

## 2024-11-27 ASSESSMENT — ENCOUNTER SYMPTOMS
COUGH: 0
NAUSEA: 0
SHORTNESS OF BREATH: 0
ABDOMINAL PAIN: 0
VOMITING: 0
RHINORRHEA: 0
CONSTIPATION: 0
DIARRHEA: 0

## 2024-11-27 NOTE — PROGRESS NOTES
Health Maintenance Due   Topic Date Due    Polio vaccine (4 of 4 - 4-dose series) 03/23/1993    Varicella vaccine (1 of 2 - 13+ 2-dose series) Never done    Hepatitis B vaccine (1 of 3 - 19+ 3-dose series) Never done    Flu vaccine (1) Never done    COVID-19 Vaccine (2 - 2023-24 season) 09/01/2024

## 2024-11-27 NOTE — PROGRESS NOTES
Andrez Toth (:  1990) is a 34 y.o. male,Established patient, here for evaluation of the following chief complaint(s):  Follow-up (Adipex ), Health Maintenance (See note ), and Headache (X this morning )      Assessment & Plan   ASSESSMENT/PLAN:  1. Class 2 obesity due to excess calories without serious comorbidity with body mass index (BMI) of 39.0 to 39.9 in adult  -     phentermine (ADIPEX-P) 37.5 MG tablet; Take 1 tablet by mouth every morning (before breakfast) for 30 days. Max Daily Amount: 37.5 mg, Disp-30 tablet, R-0Normal  2. Viral URI  -     brompheniramine-pseudoephedrine-DM 2-30-10 MG/5ML syrup; Take 5 mLs by mouth 3 times daily as needed for Congestion or Cough, Disp-118 mL, R-0Normal  -     fluticasone (FLONASE) 50 MCG/ACT nasal spray; 2 sprays by Each Nostril route 2 times daily, Disp-16 g, R-0Normal  34-year-old male presents the office for follow-up.    Obesity: Chronic, slowly improving.  Most recent weight loss after 1 month of Adipex is 8 pounds.  Continue with Adipex therapy and follow-up in 30 days for reevaluation.  PDMP reviewed and appropriate.  No concerns at this time.    Viral URI.  Patient has a mild viral upper respiratory tract infection that started today.  Will plan to with decongestant as above      Return in about 1 month (around 2024) for adipex follow up.         Subjective   SUBJECTIVE/OBJECTIVE:  34-year-old male presents to the office to follow-up on Adipex and concerns for an upper respiratory tract infection.  Patient states he is now 1 month down on Adipex therapy and has been going well.  Is lost about 8 pounds-carmelo since initiation and denies any side effects medication regimen.  Overall is pleased with how things are going would like to continue.  Patient states he woke up this morning with a cold that his kid gave him with some nasal congestion, drainage.  Denies any cough, fevers, chills.       Past Medical History:   Diagnosis Date    Asthma     ELENA

## 2024-12-18 ENCOUNTER — OFFICE VISIT (OUTPATIENT)
Dept: FAMILY MEDICINE CLINIC | Age: 34
End: 2024-12-18
Payer: COMMERCIAL

## 2024-12-18 VITALS
SYSTOLIC BLOOD PRESSURE: 138 MMHG | OXYGEN SATURATION: 94 % | DIASTOLIC BLOOD PRESSURE: 86 MMHG | BODY MASS INDEX: 37.61 KG/M2 | RESPIRATION RATE: 16 BRPM | TEMPERATURE: 97.6 F | HEART RATE: 82 BPM | WEIGHT: 285 LBS

## 2024-12-18 DIAGNOSIS — E66.812 CLASS 2 OBESITY DUE TO EXCESS CALORIES WITHOUT SERIOUS COMORBIDITY WITH BODY MASS INDEX (BMI) OF 39.0 TO 39.9 IN ADULT: ICD-10-CM

## 2024-12-18 DIAGNOSIS — E66.09 CLASS 2 OBESITY DUE TO EXCESS CALORIES WITHOUT SERIOUS COMORBIDITY WITH BODY MASS INDEX (BMI) OF 39.0 TO 39.9 IN ADULT: ICD-10-CM

## 2024-12-18 PROCEDURE — 3075F SYST BP GE 130 - 139MM HG: CPT | Performed by: STUDENT IN AN ORGANIZED HEALTH CARE EDUCATION/TRAINING PROGRAM

## 2024-12-18 PROCEDURE — 99213 OFFICE O/P EST LOW 20 MIN: CPT | Performed by: STUDENT IN AN ORGANIZED HEALTH CARE EDUCATION/TRAINING PROGRAM

## 2024-12-18 PROCEDURE — 3079F DIAST BP 80-89 MM HG: CPT | Performed by: STUDENT IN AN ORGANIZED HEALTH CARE EDUCATION/TRAINING PROGRAM

## 2024-12-18 RX ORDER — PHENTERMINE HYDROCHLORIDE 37.5 MG/1
37.5 TABLET ORAL
Qty: 30 TABLET | Refills: 0 | Status: SHIPPED | OUTPATIENT
Start: 2024-12-18 | End: 2025-01-17

## 2024-12-18 ASSESSMENT — ENCOUNTER SYMPTOMS
DIARRHEA: 0
NAUSEA: 0
ABDOMINAL PAIN: 0
SHORTNESS OF BREATH: 0
COUGH: 0
CONSTIPATION: 0
VOMITING: 0

## 2024-12-18 NOTE — PROGRESS NOTES
Andrez Toth (:  1990) is a 34 y.o. male,Established patient, here for evaluation of the following chief complaint(s):  Follow-up (Adipex ) and Health Maintenance (See note )      Assessment & Plan   ASSESSMENT/PLAN:  1. Class 2 obesity due to excess calories without serious comorbidity with body mass index (BMI) of 39.0 to 39.9 in adult  -     phentermine (ADIPEX-P) 37.5 MG tablet; Take 1 tablet by mouth every morning (before breakfast) for 30 days. Max Daily Amount: 37.5 mg, Disp-30 tablet, R-0Normal  34-year-old male presents the office for follow-up on obesity/Adipex therapy.  Patient is currently end of month to initiation of month 3 down about 10 to 12 pounds since initiation.  Continue work on lifestyle modification including diet and exercise.  Overall doing well at this time we will continue.  PDMP reviewed and appropriate    Return in about 1 month (around 2025) for adipex follow up.         Subjective   SUBJECTIVE/OBJECTIVE:  34-year-old male presents the office for a follow-up on Adipex therapy.  Patient states that he is doing pretty well.  He is currently completed about 2 months Gatorade to initiate his third month and is down about 10 to 12 pounds.  He is pleased overall with how things are going denies any acute concerns this time.  Patient is interesting continue medication regimen if possible.  He has been trying to work on increasing exercise and dietary intake although that sometimes is different this time a year.  But overall has no issues.        Past Medical History:   Diagnosis Date    Asthma     ELENA treated with BiPAP     Other hyperlipidemia 10/19/2023    Primary hypertension 10/19/2023       Past Surgical History:   Procedure Laterality Date    LUMBAR DISCECTOMY  2018       Family History   Problem Relation Age of Onset    Heart Surgery Brother 1        has down syndrome/has had several since birth.    Sleep Apnea Mother        Social History     Tobacco Use

## 2025-01-28 ENCOUNTER — OFFICE VISIT (OUTPATIENT)
Dept: FAMILY MEDICINE CLINIC | Age: 35
End: 2025-01-28
Payer: COMMERCIAL

## 2025-01-28 VITALS
DIASTOLIC BLOOD PRESSURE: 84 MMHG | RESPIRATION RATE: 18 BRPM | WEIGHT: 286 LBS | BODY MASS INDEX: 37.91 KG/M2 | TEMPERATURE: 98.4 F | HEART RATE: 85 BPM | HEIGHT: 73 IN | SYSTOLIC BLOOD PRESSURE: 127 MMHG

## 2025-01-28 DIAGNOSIS — I10 PRIMARY HYPERTENSION: ICD-10-CM

## 2025-01-28 DIAGNOSIS — Z00.00 WELL ADULT EXAM: Primary | ICD-10-CM

## 2025-01-28 DIAGNOSIS — E66.09 CLASS 2 OBESITY DUE TO EXCESS CALORIES WITHOUT SERIOUS COMORBIDITY WITH BODY MASS INDEX (BMI) OF 37.0 TO 37.9 IN ADULT: ICD-10-CM

## 2025-01-28 DIAGNOSIS — E66.812 CLASS 2 OBESITY DUE TO EXCESS CALORIES WITHOUT SERIOUS COMORBIDITY WITH BODY MASS INDEX (BMI) OF 37.0 TO 37.9 IN ADULT: ICD-10-CM

## 2025-01-28 DIAGNOSIS — E78.49 OTHER HYPERLIPIDEMIA: ICD-10-CM

## 2025-01-28 DIAGNOSIS — G47.33 OSA ON CPAP: ICD-10-CM

## 2025-01-28 PROCEDURE — 3074F SYST BP LT 130 MM HG: CPT | Performed by: STUDENT IN AN ORGANIZED HEALTH CARE EDUCATION/TRAINING PROGRAM

## 2025-01-28 PROCEDURE — 3079F DIAST BP 80-89 MM HG: CPT | Performed by: STUDENT IN AN ORGANIZED HEALTH CARE EDUCATION/TRAINING PROGRAM

## 2025-01-28 PROCEDURE — 99395 PREV VISIT EST AGE 18-39: CPT | Performed by: STUDENT IN AN ORGANIZED HEALTH CARE EDUCATION/TRAINING PROGRAM

## 2025-01-28 RX ORDER — PHENTERMINE HYDROCHLORIDE 37.5 MG/1
37.5 TABLET ORAL
Qty: 30 TABLET | Refills: 0 | Status: SHIPPED | OUTPATIENT
Start: 2025-01-28 | End: 2025-02-27

## 2025-01-28 SDOH — ECONOMIC STABILITY: FOOD INSECURITY: WITHIN THE PAST 12 MONTHS, YOU WORRIED THAT YOUR FOOD WOULD RUN OUT BEFORE YOU GOT MONEY TO BUY MORE.: NEVER TRUE

## 2025-01-28 SDOH — ECONOMIC STABILITY: FOOD INSECURITY: WITHIN THE PAST 12 MONTHS, THE FOOD YOU BOUGHT JUST DIDN'T LAST AND YOU DIDN'T HAVE MONEY TO GET MORE.: NEVER TRUE

## 2025-01-28 ASSESSMENT — PATIENT HEALTH QUESTIONNAIRE - PHQ9
SUM OF ALL RESPONSES TO PHQ QUESTIONS 1-9: 0
SUM OF ALL RESPONSES TO PHQ QUESTIONS 1-9: 0
2. FEELING DOWN, DEPRESSED OR HOPELESS: NOT AT ALL
SUM OF ALL RESPONSES TO PHQ9 QUESTIONS 1 & 2: 0
SUM OF ALL RESPONSES TO PHQ QUESTIONS 1-9: 0
1. LITTLE INTEREST OR PLEASURE IN DOING THINGS: NOT AT ALL
SUM OF ALL RESPONSES TO PHQ QUESTIONS 1-9: 0

## 2025-01-28 NOTE — PROGRESS NOTES
Andrez Toth (:  1990) is a 34 y.o. male,Established patient, here for evaluation of the following chief complaint(s):  Annual Exam (Annual Physical, discuss refills of Adipex)      Assessment & Plan   ASSESSMENT/PLAN:  1. Well adult exam  2. Primary hypertension  -     CBC with Auto Differential; Future  -     Comprehensive Metabolic Panel; Future  3. ELENA on CPAP  -     tirzepatide-weight management (ZEPBOUND) 2.5 MG/0.5ML SOAJ subCUTAneous auto-injector pen; Inject 2.5 mg into the skin every 7 days, Disp-2 mL, R-0Normal  4. Other hyperlipidemia  -     Lipid Panel; Future  5. Class 2 obesity due to excess calories without serious comorbidity with body mass index (BMI) of 37.0 to 37.9 in adult  -     tirzepatide-weight management (ZEPBOUND) 2.5 MG/0.5ML SOAJ subCUTAneous auto-injector pen; Inject 2.5 mg into the skin every 7 days, Disp-2 mL, R-0Normal  -     phentermine (ADIPEX-P) 37.5 MG tablet; Take 1 tablet by mouth every morning (before breakfast) for 30 days. Max Daily Amount: 37.5 mg, Disp-30 tablet, R-0Normal  -     Hemoglobin A1C; Future  34-year-old male presents the office for yearly well exam.    Hypertension: Chronic, well-controlled.  Blood pressure 127/84.  No need for medication assisted therapy but will continue to monitor.    ELENA on CPAP: Chronic, history of.  Will continue work on weight loss attempt GLP-1 injectable therapy but if unable to get covered will continue Adipex at this time.  Continue CPAP.    Hyperlipidemia: Chronic, uncontrolled.  Will continue to monitor.    Obesity: Chronic, slowly improving.  Is down about 10 pounds since initiation 3 months ago.  Will plan to continue to follow-up monthly and progress on the month for for continued weight loss.  Continue work on lifestyle modification including diet and exercise.  Will also plan to check blood work for follow-up  -PDMP reviewed and appropriate    Return in about 1 month (around 2025) for adipex follow up.

## 2025-01-29 ASSESSMENT — ENCOUNTER SYMPTOMS
ABDOMINAL PAIN: 0
DIARRHEA: 0
CONSTIPATION: 0
EYE REDNESS: 0
VOMITING: 0
RHINORRHEA: 0
SHORTNESS OF BREATH: 0
NAUSEA: 0
EYE PAIN: 0
COUGH: 0

## 2025-01-30 ENCOUNTER — TELEPHONE (OUTPATIENT)
Dept: FAMILY MEDICINE CLINIC | Age: 35
End: 2025-01-30

## 2025-01-30 NOTE — TELEPHONE ENCOUNTER
Prior Authorization completed. tirzepatide-weight management (ZEPBOUND) 2.5 MG/0.5ML SOAJ subCUTAneous auto-injector pen not covered by insurance. Please advise.

## 2025-01-31 DIAGNOSIS — E66.09 CLASS 2 OBESITY DUE TO EXCESS CALORIES WITHOUT SERIOUS COMORBIDITY WITH BODY MASS INDEX (BMI) OF 37.0 TO 37.9 IN ADULT: ICD-10-CM

## 2025-01-31 DIAGNOSIS — G47.33 OSA ON CPAP: Primary | ICD-10-CM

## 2025-01-31 DIAGNOSIS — E66.812 CLASS 2 OBESITY DUE TO EXCESS CALORIES WITHOUT SERIOUS COMORBIDITY WITH BODY MASS INDEX (BMI) OF 37.0 TO 37.9 IN ADULT: ICD-10-CM

## 2025-02-05 DIAGNOSIS — E66.812 CLASS 2 OBESITY DUE TO EXCESS CALORIES WITHOUT SERIOUS COMORBIDITY WITH BODY MASS INDEX (BMI) OF 37.0 TO 37.9 IN ADULT: Primary | ICD-10-CM

## 2025-02-05 DIAGNOSIS — E66.09 CLASS 2 OBESITY DUE TO EXCESS CALORIES WITHOUT SERIOUS COMORBIDITY WITH BODY MASS INDEX (BMI) OF 37.0 TO 37.9 IN ADULT: Primary | ICD-10-CM

## 2025-02-05 DIAGNOSIS — G47.33 OSA ON CPAP: ICD-10-CM

## 2025-02-25 DIAGNOSIS — E66.812 CLASS 2 OBESITY DUE TO EXCESS CALORIES WITHOUT SERIOUS COMORBIDITY WITH BODY MASS INDEX (BMI) OF 37.0 TO 37.9 IN ADULT: ICD-10-CM

## 2025-02-25 DIAGNOSIS — E66.09 CLASS 2 OBESITY DUE TO EXCESS CALORIES WITHOUT SERIOUS COMORBIDITY WITH BODY MASS INDEX (BMI) OF 37.0 TO 37.9 IN ADULT: ICD-10-CM

## 2025-02-25 DIAGNOSIS — G47.33 OSA ON CPAP: ICD-10-CM

## 2025-03-05 ENCOUNTER — OFFICE VISIT (OUTPATIENT)
Dept: FAMILY MEDICINE CLINIC | Age: 35
End: 2025-03-05
Payer: COMMERCIAL

## 2025-03-05 VITALS
OXYGEN SATURATION: 98 % | HEART RATE: 96 BPM | BODY MASS INDEX: 37.74 KG/M2 | RESPIRATION RATE: 16 BRPM | DIASTOLIC BLOOD PRESSURE: 92 MMHG | WEIGHT: 284.8 LBS | SYSTOLIC BLOOD PRESSURE: 138 MMHG | TEMPERATURE: 97.9 F | HEIGHT: 73 IN

## 2025-03-05 DIAGNOSIS — E66.09 CLASS 2 OBESITY DUE TO EXCESS CALORIES WITHOUT SERIOUS COMORBIDITY WITH BODY MASS INDEX (BMI) OF 37.0 TO 37.9 IN ADULT: ICD-10-CM

## 2025-03-05 DIAGNOSIS — E66.812 CLASS 2 OBESITY DUE TO EXCESS CALORIES WITHOUT SERIOUS COMORBIDITY WITH BODY MASS INDEX (BMI) OF 37.0 TO 37.9 IN ADULT: ICD-10-CM

## 2025-03-05 DIAGNOSIS — G47.33 OSA ON CPAP: ICD-10-CM

## 2025-03-05 PROCEDURE — G8417 CALC BMI ABV UP PARAM F/U: HCPCS | Performed by: STUDENT IN AN ORGANIZED HEALTH CARE EDUCATION/TRAINING PROGRAM

## 2025-03-05 PROCEDURE — G8427 DOCREV CUR MEDS BY ELIG CLIN: HCPCS | Performed by: STUDENT IN AN ORGANIZED HEALTH CARE EDUCATION/TRAINING PROGRAM

## 2025-03-05 PROCEDURE — 1036F TOBACCO NON-USER: CPT | Performed by: STUDENT IN AN ORGANIZED HEALTH CARE EDUCATION/TRAINING PROGRAM

## 2025-03-05 PROCEDURE — 3080F DIAST BP >= 90 MM HG: CPT | Performed by: STUDENT IN AN ORGANIZED HEALTH CARE EDUCATION/TRAINING PROGRAM

## 2025-03-05 PROCEDURE — 3075F SYST BP GE 130 - 139MM HG: CPT | Performed by: STUDENT IN AN ORGANIZED HEALTH CARE EDUCATION/TRAINING PROGRAM

## 2025-03-05 PROCEDURE — 99213 OFFICE O/P EST LOW 20 MIN: CPT | Performed by: STUDENT IN AN ORGANIZED HEALTH CARE EDUCATION/TRAINING PROGRAM

## 2025-03-05 ASSESSMENT — ENCOUNTER SYMPTOMS
COUGH: 0
VOMITING: 0
NAUSEA: 0
SHORTNESS OF BREATH: 0
DIARRHEA: 0
CONSTIPATION: 0
ABDOMINAL PAIN: 0

## 2025-03-06 ENCOUNTER — OFFICE VISIT (OUTPATIENT)
Dept: PULMONOLOGY | Age: 35
End: 2025-03-06
Payer: COMMERCIAL

## 2025-03-06 VITALS
TEMPERATURE: 98.1 F | WEIGHT: 290.4 LBS | BODY MASS INDEX: 39.33 KG/M2 | SYSTOLIC BLOOD PRESSURE: 130 MMHG | HEIGHT: 72 IN | HEART RATE: 114 BPM | DIASTOLIC BLOOD PRESSURE: 78 MMHG | OXYGEN SATURATION: 98 %

## 2025-03-06 DIAGNOSIS — E66.9 OBESITY (BMI 30-39.9): ICD-10-CM

## 2025-03-06 DIAGNOSIS — G47.33 OSA TREATED WITH BIPAP: Primary | ICD-10-CM

## 2025-03-06 PROCEDURE — 3078F DIAST BP <80 MM HG: CPT

## 2025-03-06 PROCEDURE — 1036F TOBACCO NON-USER: CPT

## 2025-03-06 PROCEDURE — 99214 OFFICE O/P EST MOD 30 MIN: CPT

## 2025-03-06 PROCEDURE — G8427 DOCREV CUR MEDS BY ELIG CLIN: HCPCS

## 2025-03-06 PROCEDURE — G8417 CALC BMI ABV UP PARAM F/U: HCPCS

## 2025-03-06 PROCEDURE — 3075F SYST BP GE 130 - 139MM HG: CPT

## 2025-03-06 ASSESSMENT — ENCOUNTER SYMPTOMS
SHORTNESS OF BREATH: 0
RHINORRHEA: 0
SORE THROAT: 0
CHEST TIGHTNESS: 0

## 2025-03-06 NOTE — PROGRESS NOTES
a set pressure of IPAP 15 cmH2O, EPAP 11 cm H2O, most recent download shows good compliance, AHI 2.5. Mask fit appropriate, pressures appropriate. Order for new supplies placed. Follow up in 1 year.  Obesity: BMI 39.39, Discussed and encouraged weight loss- pt reports actively trying to lose weight, recently started Monjouro- per PCP.       Educated on good sleep hygiene practices   Recommend 7-9 hours of sleep nightly   Will see Andrez Toth back in: 1 year    Keesha Soto PA-C   [facility]  3/6/2025   (Please note that portions of this note may have been with a voice recognition program.  Efforts were made to edit the dictation but occasionally words are mis-transcribed )

## 2025-03-31 LAB
ALBUMIN: 4.6 G/DL
ALP BLD-CCNC: 75 U/L
ALT SERPL-CCNC: 64 U/L
ANION GAP SERPL CALCULATED.3IONS-SCNC: NORMAL MMOL/L
AST SERPL-CCNC: 29 U/L
BASOPHILS ABSOLUTE: 0.06 /ΜL
BASOPHILS RELATIVE PERCENT: 0.8 %
BILIRUB SERPL-MCNC: 0.4 MG/DL (ref 0.1–1.4)
BUN BLDV-MCNC: NORMAL MG/DL
CALCIUM SERPL-MCNC: 9.7 MG/DL
CHLORIDE BLD-SCNC: 100 MMOL/L
CHOLESTEROL, TOTAL: 220 MG/DL
CHOLESTEROL/HDL RATIO: 5.4
CO2: 27 MMOL/L
CREAT SERPL-MCNC: 0.93 MG/DL
EOSINOPHILS ABSOLUTE: 0.31 /ΜL
EOSINOPHILS RELATIVE PERCENT: 4.1 %
GFR, ESTIMATED: 109
GLUCOSE BLD-MCNC: 81 MG/DL
HCT VFR BLD CALC: 46.1 % (ref 41–53)
HDLC SERPL-MCNC: 25 MG/DL (ref 35–70)
HEMOGLOBIN: 16 G/DL (ref 13.5–17.5)
LDL CHOLESTEROL: 141
LYMPHOCYTES ABSOLUTE: 3.14 /ΜL
LYMPHOCYTES RELATIVE PERCENT: 42 %
MCH RBC QN AUTO: 30.3 PG
MCHC RBC AUTO-ENTMCNC: 34.7 G/DL
MCV RBC AUTO: 87 FL
MONOCYTES ABSOLUTE: 0.51 /ΜL
MONOCYTES RELATIVE PERCENT: 6.8 %
NEUTROPHILS ABSOLUTE: 3.44 /ΜL
NEUTROPHILS RELATIVE PERCENT: NORMAL
NONHDLC SERPL-MCNC: ABNORMAL MG/DL
PLATELET # BLD: 311 K/ΜL
PMV BLD AUTO: 311 FL
POTASSIUM SERPL-SCNC: 4.3 MMOL/L
RBC # BLD: 5.28 10^6/ΜL
SODIUM BLD-SCNC: 140 MMOL/L
TOTAL PROTEIN: 7.3 G/DL (ref 6.4–8.2)
TRIGL SERPL-MCNC: 267 MG/DL
TSH SERPL DL<=0.05 MIU/L-ACNC: 2.12 UIU/ML
VLDLC SERPL CALC-MCNC: 53 MG/DL
WBC # BLD: 7.5 10^3/ML

## 2025-04-02 DIAGNOSIS — E66.09 CLASS 2 OBESITY DUE TO EXCESS CALORIES WITHOUT SERIOUS COMORBIDITY WITH BODY MASS INDEX (BMI) OF 37.0 TO 37.9 IN ADULT: Primary | ICD-10-CM

## 2025-04-02 DIAGNOSIS — G47.33 OSA ON CPAP: ICD-10-CM

## 2025-04-02 DIAGNOSIS — E66.09 CLASS 2 OBESITY DUE TO EXCESS CALORIES WITHOUT SERIOUS COMORBIDITY WITH BODY MASS INDEX (BMI) OF 37.0 TO 37.9 IN ADULT: ICD-10-CM

## 2025-04-02 DIAGNOSIS — E66.812 CLASS 2 OBESITY DUE TO EXCESS CALORIES WITHOUT SERIOUS COMORBIDITY WITH BODY MASS INDEX (BMI) OF 37.0 TO 37.9 IN ADULT: Primary | ICD-10-CM

## 2025-04-02 DIAGNOSIS — E66.812 CLASS 2 OBESITY DUE TO EXCESS CALORIES WITHOUT SERIOUS COMORBIDITY WITH BODY MASS INDEX (BMI) OF 37.0 TO 37.9 IN ADULT: ICD-10-CM

## 2025-04-28 DIAGNOSIS — E66.812 CLASS 2 OBESITY DUE TO EXCESS CALORIES WITHOUT SERIOUS COMORBIDITY WITH BODY MASS INDEX (BMI) OF 37.0 TO 37.9 IN ADULT: ICD-10-CM

## 2025-04-28 DIAGNOSIS — E66.09 CLASS 2 OBESITY DUE TO EXCESS CALORIES WITHOUT SERIOUS COMORBIDITY WITH BODY MASS INDEX (BMI) OF 37.0 TO 37.9 IN ADULT: ICD-10-CM

## 2025-04-28 DIAGNOSIS — G47.33 OSA ON CPAP: ICD-10-CM

## 2025-04-28 DIAGNOSIS — G47.33 OSA ON CPAP: Primary | ICD-10-CM

## 2025-04-28 RX ORDER — TIRZEPATIDE 10 MG/.5ML
10 INJECTION, SOLUTION SUBCUTANEOUS WEEKLY
Qty: 2 ML | Refills: 0 | Status: SHIPPED | OUTPATIENT
Start: 2025-04-28 | End: 2025-05-28

## 2025-04-28 RX ORDER — TIRZEPATIDE 10 MG/.5ML
10 INJECTION, SOLUTION SUBCUTANEOUS
Qty: 2 ML | Refills: 0 | Status: SHIPPED | OUTPATIENT
Start: 2025-04-28 | End: 2025-04-28

## 2025-05-27 DIAGNOSIS — G47.33 OSA ON CPAP: ICD-10-CM

## 2025-05-27 DIAGNOSIS — E66.812 CLASS 2 OBESITY DUE TO EXCESS CALORIES WITHOUT SERIOUS COMORBIDITY WITH BODY MASS INDEX (BMI) OF 37.0 TO 37.9 IN ADULT: Primary | ICD-10-CM

## 2025-05-27 DIAGNOSIS — E66.09 CLASS 2 OBESITY DUE TO EXCESS CALORIES WITHOUT SERIOUS COMORBIDITY WITH BODY MASS INDEX (BMI) OF 37.0 TO 37.9 IN ADULT: Primary | ICD-10-CM

## 2025-06-05 ENCOUNTER — OFFICE VISIT (OUTPATIENT)
Dept: FAMILY MEDICINE CLINIC | Age: 35
End: 2025-06-05
Payer: COMMERCIAL

## 2025-06-05 VITALS
SYSTOLIC BLOOD PRESSURE: 132 MMHG | OXYGEN SATURATION: 98 % | DIASTOLIC BLOOD PRESSURE: 78 MMHG | WEIGHT: 273.8 LBS | HEART RATE: 106 BPM | TEMPERATURE: 98.3 F | BODY MASS INDEX: 37.13 KG/M2

## 2025-06-05 DIAGNOSIS — E66.812 CLASS 2 OBESITY DUE TO EXCESS CALORIES WITHOUT SERIOUS COMORBIDITY WITH BODY MASS INDEX (BMI) OF 37.0 TO 37.9 IN ADULT: Primary | ICD-10-CM

## 2025-06-05 DIAGNOSIS — E78.49 OTHER HYPERLIPIDEMIA: ICD-10-CM

## 2025-06-05 DIAGNOSIS — I10 PRIMARY HYPERTENSION: ICD-10-CM

## 2025-06-05 DIAGNOSIS — E66.09 CLASS 2 OBESITY DUE TO EXCESS CALORIES WITHOUT SERIOUS COMORBIDITY WITH BODY MASS INDEX (BMI) OF 37.0 TO 37.9 IN ADULT: Primary | ICD-10-CM

## 2025-06-05 DIAGNOSIS — G47.33 OSA ON CPAP: ICD-10-CM

## 2025-06-05 PROCEDURE — G8417 CALC BMI ABV UP PARAM F/U: HCPCS | Performed by: STUDENT IN AN ORGANIZED HEALTH CARE EDUCATION/TRAINING PROGRAM

## 2025-06-05 PROCEDURE — G8427 DOCREV CUR MEDS BY ELIG CLIN: HCPCS | Performed by: STUDENT IN AN ORGANIZED HEALTH CARE EDUCATION/TRAINING PROGRAM

## 2025-06-05 PROCEDURE — 99213 OFFICE O/P EST LOW 20 MIN: CPT | Performed by: STUDENT IN AN ORGANIZED HEALTH CARE EDUCATION/TRAINING PROGRAM

## 2025-06-05 PROCEDURE — 3078F DIAST BP <80 MM HG: CPT | Performed by: STUDENT IN AN ORGANIZED HEALTH CARE EDUCATION/TRAINING PROGRAM

## 2025-06-05 PROCEDURE — 3075F SYST BP GE 130 - 139MM HG: CPT | Performed by: STUDENT IN AN ORGANIZED HEALTH CARE EDUCATION/TRAINING PROGRAM

## 2025-06-05 PROCEDURE — 1036F TOBACCO NON-USER: CPT | Performed by: STUDENT IN AN ORGANIZED HEALTH CARE EDUCATION/TRAINING PROGRAM

## 2025-06-05 ASSESSMENT — ENCOUNTER SYMPTOMS
ABDOMINAL PAIN: 0
SHORTNESS OF BREATH: 0
EYE PAIN: 0
COUGH: 0
NAUSEA: 0
VOMITING: 0
EYE REDNESS: 0
CONSTIPATION: 0
DIARRHEA: 0

## 2025-06-05 NOTE — PROGRESS NOTES
Andrez Toth (:  1990) is a 35 y.o. male,Established patient, here for evaluation of the following chief complaint(s):  3 Month Follow-Up      Assessment & Plan   ASSESSMENT/PLAN:  1. Class 2 obesity due to excess calories without serious comorbidity with body mass index (BMI) of 37.0 to 37.9 in adult  2. Other hyperlipidemia  3. Primary hypertension  4. ELENA on CPAP  35-year-old male presents office to follow-up on weight loss.    Obesity: Chronic, slowly improving.  Patient is down about 13 pounds since last visit currently weighing in at 273 pounds with a BMI of 37.13.  Is on Mounjaro 12.5 mg weekly and has had great success.  Denies any side effects.  Will continue with the 12.5 mg weekly dose and follow-up in 3 months for continued medication titration/reevaluation.  Patient verbalized understanding.  Continue to work on lifestyle modification including diet and exercise.    Chronic hyperlipidemia/hypertension/ELENA on CPAP is stable.  Hypertension well-controlled without medication assisted therapy 132/78.      Return in about 3 months (around 2025) for medication follow up.         Subjective   SUBJECTIVE/OBJECTIVE:  35-year-old male presents the office to follow-up on obesity.  Patient states that he has had pretty successful weight loss with Mounjaro.  Since last visit he is down about 17-carmelo pounds.  Patient is pleased with how things are going and overall denies any concerns.  He has not had any side effects currently tolerating the 12.5 mg weekly dose well.  Patient has no questions or concerns and is here to update his medication and to make sure nothing else needs to be changed.      Past Medical History:   Diagnosis Date    Asthma     ELENA treated with BiPAP     Other hyperlipidemia 10/19/2023    Primary hypertension 10/19/2023       Past Surgical History:   Procedure Laterality Date    LUMBAR DISCECTOMY  2018       Family History   Problem Relation Age of Onset    Heart Surgery

## 2025-06-26 DIAGNOSIS — E66.812 CLASS 2 OBESITY DUE TO EXCESS CALORIES WITHOUT SERIOUS COMORBIDITY WITH BODY MASS INDEX (BMI) OF 37.0 TO 37.9 IN ADULT: ICD-10-CM

## 2025-06-26 DIAGNOSIS — E66.09 CLASS 2 OBESITY DUE TO EXCESS CALORIES WITHOUT SERIOUS COMORBIDITY WITH BODY MASS INDEX (BMI) OF 37.0 TO 37.9 IN ADULT: ICD-10-CM

## 2025-06-26 DIAGNOSIS — G47.33 OSA ON CPAP: ICD-10-CM
